# Patient Record
Sex: MALE | Race: WHITE | NOT HISPANIC OR LATINO
[De-identification: names, ages, dates, MRNs, and addresses within clinical notes are randomized per-mention and may not be internally consistent; named-entity substitution may affect disease eponyms.]

---

## 2017-03-26 ENCOUNTER — FORM ENCOUNTER (OUTPATIENT)
Age: 82
End: 2017-03-26

## 2017-03-27 ENCOUNTER — APPOINTMENT (OUTPATIENT)
Dept: CT IMAGING | Facility: HOSPITAL | Age: 82
End: 2017-03-27

## 2017-03-27 ENCOUNTER — OUTPATIENT (OUTPATIENT)
Dept: OUTPATIENT SERVICES | Facility: HOSPITAL | Age: 82
LOS: 1 days | End: 2017-03-27
Payer: MEDICARE

## 2017-03-27 DIAGNOSIS — I25.10 ATHEROSCLEROTIC HEART DISEASE OF NATIVE CORONARY ARTERY WITHOUT ANGINA PECTORIS: ICD-10-CM

## 2017-03-27 DIAGNOSIS — C34.90 MALIGNANT NEOPLASM OF UNSPECIFIED PART OF UNSPECIFIED BRONCHUS OR LUNG: ICD-10-CM

## 2017-03-27 DIAGNOSIS — Z98.89 OTHER SPECIFIED POSTPROCEDURAL STATES: Chronic | ICD-10-CM

## 2017-03-27 PROCEDURE — 71250 CT THORAX DX C-: CPT

## 2017-03-28 ENCOUNTER — NON-APPOINTMENT (OUTPATIENT)
Age: 82
End: 2017-03-28

## 2017-03-28 ENCOUNTER — APPOINTMENT (OUTPATIENT)
Dept: CARDIOLOGY | Facility: CLINIC | Age: 82
End: 2017-03-28

## 2017-03-28 VITALS
HEART RATE: 83 BPM | HEIGHT: 65 IN | BODY MASS INDEX: 27.49 KG/M2 | OXYGEN SATURATION: 98 % | SYSTOLIC BLOOD PRESSURE: 148 MMHG | RESPIRATION RATE: 17 BRPM | WEIGHT: 165 LBS | DIASTOLIC BLOOD PRESSURE: 78 MMHG

## 2017-03-28 DIAGNOSIS — Z87.898 PERSONAL HISTORY OF OTHER SPECIFIED CONDITIONS: ICD-10-CM

## 2017-04-03 ENCOUNTER — APPOINTMENT (OUTPATIENT)
Dept: THORACIC SURGERY | Facility: CLINIC | Age: 82
End: 2017-04-03

## 2017-04-03 VITALS
WEIGHT: 164 LBS | OXYGEN SATURATION: 97 % | SYSTOLIC BLOOD PRESSURE: 142 MMHG | DIASTOLIC BLOOD PRESSURE: 72 MMHG | HEIGHT: 65 IN | HEART RATE: 62 BPM | BODY MASS INDEX: 27.32 KG/M2 | RESPIRATION RATE: 18 BRPM

## 2017-09-29 ENCOUNTER — APPOINTMENT (OUTPATIENT)
Dept: CT IMAGING | Facility: HOSPITAL | Age: 82
End: 2017-09-29

## 2017-09-29 ENCOUNTER — APPOINTMENT (OUTPATIENT)
Dept: CARDIOLOGY | Facility: CLINIC | Age: 82
End: 2017-09-29
Payer: MEDICARE

## 2017-09-29 ENCOUNTER — NON-APPOINTMENT (OUTPATIENT)
Age: 82
End: 2017-09-29

## 2017-09-29 ENCOUNTER — OUTPATIENT (OUTPATIENT)
Dept: OUTPATIENT SERVICES | Facility: HOSPITAL | Age: 82
LOS: 1 days | End: 2017-09-29
Payer: MEDICARE

## 2017-09-29 VITALS
RESPIRATION RATE: 17 BRPM | WEIGHT: 162 LBS | SYSTOLIC BLOOD PRESSURE: 170 MMHG | OXYGEN SATURATION: 97 % | HEART RATE: 67 BPM | HEIGHT: 65 IN | BODY MASS INDEX: 26.99 KG/M2 | DIASTOLIC BLOOD PRESSURE: 78 MMHG

## 2017-09-29 DIAGNOSIS — Z85.118 PERSONAL HISTORY OF OTHER MALIGNANT NEOPLASM OF BRONCHUS AND LUNG: ICD-10-CM

## 2017-09-29 DIAGNOSIS — Z98.89 OTHER SPECIFIED POSTPROCEDURAL STATES: Chronic | ICD-10-CM

## 2017-09-29 DIAGNOSIS — C34.90 MALIGNANT NEOPLASM OF UNSPECIFIED PART OF UNSPECIFIED BRONCHUS OR LUNG: ICD-10-CM

## 2017-09-29 PROCEDURE — 93000 ELECTROCARDIOGRAM COMPLETE: CPT

## 2017-09-29 PROCEDURE — 71250 CT THORAX DX C-: CPT | Mod: 26

## 2017-09-29 PROCEDURE — 71250 CT THORAX DX C-: CPT

## 2017-09-29 PROCEDURE — 99215 OFFICE O/P EST HI 40 MIN: CPT

## 2017-10-09 ENCOUNTER — APPOINTMENT (OUTPATIENT)
Dept: THORACIC SURGERY | Facility: CLINIC | Age: 82
End: 2017-10-09
Payer: MEDICARE

## 2017-10-16 ENCOUNTER — APPOINTMENT (OUTPATIENT)
Dept: THORACIC SURGERY | Facility: CLINIC | Age: 82
End: 2017-10-16
Payer: MEDICARE

## 2017-10-16 VITALS
DIASTOLIC BLOOD PRESSURE: 64 MMHG | OXYGEN SATURATION: 98 % | HEART RATE: 78 BPM | BODY MASS INDEX: 27.49 KG/M2 | SYSTOLIC BLOOD PRESSURE: 174 MMHG | HEIGHT: 65 IN | WEIGHT: 165 LBS

## 2017-10-16 PROCEDURE — 99214 OFFICE O/P EST MOD 30 MIN: CPT

## 2018-01-19 ENCOUNTER — NON-APPOINTMENT (OUTPATIENT)
Age: 83
End: 2018-01-19

## 2018-01-19 ENCOUNTER — APPOINTMENT (OUTPATIENT)
Dept: CARDIOLOGY | Facility: CLINIC | Age: 83
End: 2018-01-19
Payer: MEDICARE

## 2018-01-19 VITALS
RESPIRATION RATE: 17 BRPM | HEART RATE: 87 BPM | SYSTOLIC BLOOD PRESSURE: 160 MMHG | HEIGHT: 65 IN | WEIGHT: 164 LBS | DIASTOLIC BLOOD PRESSURE: 80 MMHG | OXYGEN SATURATION: 98 % | BODY MASS INDEX: 27.32 KG/M2

## 2018-01-19 PROCEDURE — 93306 TTE W/DOPPLER COMPLETE: CPT

## 2018-01-19 PROCEDURE — 99215 OFFICE O/P EST HI 40 MIN: CPT

## 2018-01-19 PROCEDURE — 93000 ELECTROCARDIOGRAM COMPLETE: CPT

## 2018-02-01 ENCOUNTER — APPOINTMENT (OUTPATIENT)
Dept: PULMONOLOGY | Facility: CLINIC | Age: 83
End: 2018-02-01
Payer: MEDICARE

## 2018-02-01 VITALS
HEART RATE: 73 BPM | SYSTOLIC BLOOD PRESSURE: 150 MMHG | WEIGHT: 163 LBS | BODY MASS INDEX: 27.16 KG/M2 | HEIGHT: 65 IN | DIASTOLIC BLOOD PRESSURE: 60 MMHG | OXYGEN SATURATION: 97 %

## 2018-02-01 DIAGNOSIS — R91.8 OTHER NONSPECIFIC ABNORMAL FINDING OF LUNG FIELD: ICD-10-CM

## 2018-02-01 DIAGNOSIS — Z87.891 PERSONAL HISTORY OF NICOTINE DEPENDENCE: ICD-10-CM

## 2018-02-01 PROCEDURE — 99205 OFFICE O/P NEW HI 60 MIN: CPT

## 2018-04-20 ENCOUNTER — NON-APPOINTMENT (OUTPATIENT)
Age: 83
End: 2018-04-20

## 2018-04-20 ENCOUNTER — APPOINTMENT (OUTPATIENT)
Dept: CARDIOLOGY | Facility: CLINIC | Age: 83
End: 2018-04-20
Payer: MEDICARE

## 2018-04-20 VITALS
OXYGEN SATURATION: 98 % | SYSTOLIC BLOOD PRESSURE: 170 MMHG | HEART RATE: 100 BPM | RESPIRATION RATE: 17 BRPM | WEIGHT: 162 LBS | DIASTOLIC BLOOD PRESSURE: 68 MMHG | BODY MASS INDEX: 26.99 KG/M2 | HEIGHT: 65 IN

## 2018-04-20 PROCEDURE — 93000 ELECTROCARDIOGRAM COMPLETE: CPT

## 2018-04-20 PROCEDURE — 99215 OFFICE O/P EST HI 40 MIN: CPT

## 2018-08-17 ENCOUNTER — NON-APPOINTMENT (OUTPATIENT)
Age: 83
End: 2018-08-17

## 2018-08-17 ENCOUNTER — APPOINTMENT (OUTPATIENT)
Dept: CARDIOLOGY | Facility: CLINIC | Age: 83
End: 2018-08-17
Payer: MEDICARE

## 2018-08-17 VITALS
SYSTOLIC BLOOD PRESSURE: 135 MMHG | RESPIRATION RATE: 17 BRPM | WEIGHT: 158 LBS | OXYGEN SATURATION: 98 % | DIASTOLIC BLOOD PRESSURE: 56 MMHG | BODY MASS INDEX: 26.33 KG/M2 | HEART RATE: 76 BPM | HEIGHT: 65 IN

## 2018-08-17 PROCEDURE — 93000 ELECTROCARDIOGRAM COMPLETE: CPT

## 2018-08-17 PROCEDURE — 99214 OFFICE O/P EST MOD 30 MIN: CPT

## 2018-10-10 ENCOUNTER — FORM ENCOUNTER (OUTPATIENT)
Age: 83
End: 2018-10-10

## 2018-10-11 ENCOUNTER — OUTPATIENT (OUTPATIENT)
Dept: OUTPATIENT SERVICES | Facility: HOSPITAL | Age: 83
LOS: 1 days | End: 2018-10-11
Payer: MEDICARE

## 2018-10-11 ENCOUNTER — APPOINTMENT (OUTPATIENT)
Dept: CT IMAGING | Facility: HOSPITAL | Age: 83
End: 2018-10-11
Payer: MEDICARE

## 2018-10-11 DIAGNOSIS — Z98.89 OTHER SPECIFIED POSTPROCEDURAL STATES: Chronic | ICD-10-CM

## 2018-10-11 DIAGNOSIS — C34.32 MALIGNANT NEOPLASM OF LOWER LOBE, LEFT BRONCHUS OR LUNG: ICD-10-CM

## 2018-10-11 DIAGNOSIS — C34.31 MALIGNANT NEOPLASM OF LOWER LOBE, RIGHT BRONCHUS OR LUNG: ICD-10-CM

## 2018-10-11 PROCEDURE — 71250 CT THORAX DX C-: CPT | Mod: 26

## 2018-10-11 PROCEDURE — 71250 CT THORAX DX C-: CPT

## 2018-10-15 ENCOUNTER — APPOINTMENT (OUTPATIENT)
Dept: THORACIC SURGERY | Facility: CLINIC | Age: 83
End: 2018-10-15
Payer: MEDICARE

## 2018-10-15 VITALS
BODY MASS INDEX: 26.99 KG/M2 | DIASTOLIC BLOOD PRESSURE: 70 MMHG | OXYGEN SATURATION: 97 % | WEIGHT: 162 LBS | HEART RATE: 65 BPM | RESPIRATION RATE: 18 BRPM | SYSTOLIC BLOOD PRESSURE: 176 MMHG | TEMPERATURE: 98 F | HEIGHT: 65 IN

## 2018-10-15 PROCEDURE — 99214 OFFICE O/P EST MOD 30 MIN: CPT

## 2018-12-14 ENCOUNTER — NON-APPOINTMENT (OUTPATIENT)
Age: 83
End: 2018-12-14

## 2018-12-14 ENCOUNTER — APPOINTMENT (OUTPATIENT)
Dept: CARDIOLOGY | Facility: CLINIC | Age: 83
End: 2018-12-14
Payer: MEDICARE

## 2018-12-14 VITALS
RESPIRATION RATE: 17 BRPM | DIASTOLIC BLOOD PRESSURE: 80 MMHG | HEART RATE: 100 BPM | OXYGEN SATURATION: 99 % | HEIGHT: 65 IN | WEIGHT: 159 LBS | SYSTOLIC BLOOD PRESSURE: 163 MMHG | BODY MASS INDEX: 26.49 KG/M2

## 2018-12-14 PROCEDURE — 93000 ELECTROCARDIOGRAM COMPLETE: CPT

## 2018-12-14 PROCEDURE — 99214 OFFICE O/P EST MOD 30 MIN: CPT

## 2019-04-15 ENCOUNTER — APPOINTMENT (OUTPATIENT)
Dept: CARDIOLOGY | Facility: CLINIC | Age: 84
End: 2019-04-15
Payer: MEDICARE

## 2019-04-15 ENCOUNTER — NON-APPOINTMENT (OUTPATIENT)
Age: 84
End: 2019-04-15

## 2019-04-15 VITALS — SYSTOLIC BLOOD PRESSURE: 120 MMHG | OXYGEN SATURATION: 97 % | DIASTOLIC BLOOD PRESSURE: 68 MMHG | HEART RATE: 61 BPM

## 2019-04-15 PROCEDURE — 93306 TTE W/DOPPLER COMPLETE: CPT

## 2019-04-15 PROCEDURE — 93000 ELECTROCARDIOGRAM COMPLETE: CPT

## 2019-04-15 PROCEDURE — 99214 OFFICE O/P EST MOD 30 MIN: CPT

## 2019-08-20 ENCOUNTER — APPOINTMENT (OUTPATIENT)
Dept: CARDIOLOGY | Facility: CLINIC | Age: 84
End: 2019-08-20
Payer: MEDICARE

## 2019-08-20 ENCOUNTER — NON-APPOINTMENT (OUTPATIENT)
Age: 84
End: 2019-08-20

## 2019-08-20 VITALS
RESPIRATION RATE: 15 BRPM | WEIGHT: 156 LBS | OXYGEN SATURATION: 98 % | HEART RATE: 86 BPM | DIASTOLIC BLOOD PRESSURE: 73 MMHG | SYSTOLIC BLOOD PRESSURE: 135 MMHG | BODY MASS INDEX: 25.99 KG/M2 | HEIGHT: 65 IN

## 2019-08-20 PROCEDURE — 99214 OFFICE O/P EST MOD 30 MIN: CPT

## 2019-08-20 PROCEDURE — 93000 ELECTROCARDIOGRAM COMPLETE: CPT

## 2019-09-30 ENCOUNTER — APPOINTMENT (OUTPATIENT)
Dept: CT IMAGING | Facility: HOSPITAL | Age: 84
End: 2019-09-30
Payer: MEDICARE

## 2019-09-30 ENCOUNTER — OUTPATIENT (OUTPATIENT)
Dept: OUTPATIENT SERVICES | Facility: HOSPITAL | Age: 84
LOS: 1 days | End: 2019-09-30
Payer: MEDICARE

## 2019-09-30 DIAGNOSIS — C34.32 MALIGNANT NEOPLASM OF LOWER LOBE, LEFT BRONCHUS OR LUNG: ICD-10-CM

## 2019-09-30 DIAGNOSIS — C34.31 MALIGNANT NEOPLASM OF LOWER LOBE, RIGHT BRONCHUS OR LUNG: ICD-10-CM

## 2019-09-30 DIAGNOSIS — Z98.89 OTHER SPECIFIED POSTPROCEDURAL STATES: Chronic | ICD-10-CM

## 2019-09-30 PROCEDURE — 71250 CT THORAX DX C-: CPT

## 2019-09-30 PROCEDURE — 71250 CT THORAX DX C-: CPT | Mod: 26

## 2019-10-17 ENCOUNTER — APPOINTMENT (OUTPATIENT)
Dept: THORACIC SURGERY | Facility: CLINIC | Age: 84
End: 2019-10-17
Payer: MEDICARE

## 2019-10-17 VITALS
BODY MASS INDEX: 26.49 KG/M2 | HEIGHT: 65 IN | WEIGHT: 159 LBS | HEART RATE: 72 BPM | RESPIRATION RATE: 16 BRPM | TEMPERATURE: 98.3 F | DIASTOLIC BLOOD PRESSURE: 71 MMHG | SYSTOLIC BLOOD PRESSURE: 145 MMHG | OXYGEN SATURATION: 97 %

## 2019-10-17 DIAGNOSIS — Z00.00 ENCOUNTER FOR GENERAL ADULT MEDICAL EXAMINATION W/OUT ABNORMAL FINDINGS: ICD-10-CM

## 2019-10-17 PROCEDURE — 99214 OFFICE O/P EST MOD 30 MIN: CPT

## 2019-10-22 NOTE — ASSESSMENT
[FreeTextEntry1] : 87 year old male, follow up, s/p Left VATS, LLL wedge resection on 7/9/2015 for Stage IA (pT1b pN0 pMx) adenocarcinoma, and s/p Right VATS, RLL wedge resection on 1/30/2014 for Stage IA (pT1a pN0 pMx) adenocarcinoma.\par \par CT Chest on 9/30/19 reveals:\par - Stable bilateral lower lobe postoperative, stable scarring noted laterally in lingula\par - No new nodules or lymphadenopathy\par \par I have reviewed the patient's medical records and diagnostic images during the time of this office visit, and I have made the following recommendation: \par 1.  Return to office for follow up with CT Chest in 1 year.\par \par \par Written by Wendy Jurado NP, acting as a scribe for Dakota Land MD.\par \par The documentation recorded by the scribe accurately reflects the service I personally performed and the decisions made by me. DAKOTA LAND MD

## 2019-10-22 NOTE — PHYSICAL EXAM
[Sclera] : the sclera and conjunctiva were normal [PERRL With Normal Accommodation] : pupils were equal in size, round, and reactive to light [Neck Appearance] : the appearance of the neck was normal [] : no respiratory distress [Auscultation Breath Sounds / Voice Sounds] : lungs were clear to auscultation bilaterally [Respiration, Rhythm And Depth] : normal respiratory rhythm and effort [Edema] : there was no peripheral edema [Examination Of The Chest] : the chest was normal in appearance [Abdomen Soft] : soft [Abdomen Tenderness] : non-tender [Cervical Lymph Nodes Enlarged Anterior Bilaterally] : anterior cervical [Cervical Lymph Nodes Enlarged Posterior Bilaterally] : posterior cervical [Supraclavicular Lymph Nodes Enlarged Bilaterally] : supraclavicular [Abnormal Walk] : normal gait [Skin Turgor] : normal skin turgor [Skin Color & Pigmentation] : normal skin color and pigmentation [Oriented To Time, Place, And Person] : oriented to person, place, and time [No Focal Deficits] : no focal deficits [Affect] : the affect was normal [Mood] : the mood was normal [FreeTextEntry1] : murmur noted

## 2019-10-22 NOTE — CONSULT LETTER
[FreeTextEntry2] : Dr. Liliana Hamilton (PCP)\par Dr. Brayden Morales (Cardio)\par Dr. Kedar Duran (Pulm)\par

## 2019-10-22 NOTE — HISTORY OF PRESENT ILLNESS
[FreeTextEntry1] : Mr. Lindsey is an 87 year old male who presents today for one year follow up.  He is s/p Left VATS, LLL wedge resection on 7/9/2015 for Stage IA (pT1b pN0 pMx) adenocarcinoma, and s/p Right VATS, RLL wedge resection on 1/30/2014 for Stage IA (pT1a pN0 pMx) adenocarcinoma.\par \par CT Chest on 9/30/19 reveals:\par - Stable bilateral lower lobe postoperative, stable scarring noted laterally in lingula\par - No new nodules or lymphadenopathy\par - Stable scattered small cysts and subcentimeter hypodensities noted in liver\par \par He feels well and he denies any fever, chills, cough, shortness of breath, chest pain, hemoptysis, recent illness, or interval changes in health.

## 2019-12-17 ENCOUNTER — NON-APPOINTMENT (OUTPATIENT)
Age: 84
End: 2019-12-17

## 2019-12-17 ENCOUNTER — APPOINTMENT (OUTPATIENT)
Dept: CARDIOLOGY | Facility: CLINIC | Age: 84
End: 2019-12-17
Payer: MEDICARE

## 2019-12-17 VITALS
RESPIRATION RATE: 17 BRPM | BODY MASS INDEX: 25.83 KG/M2 | DIASTOLIC BLOOD PRESSURE: 66 MMHG | HEART RATE: 88 BPM | SYSTOLIC BLOOD PRESSURE: 163 MMHG | HEIGHT: 65 IN | OXYGEN SATURATION: 100 % | WEIGHT: 155 LBS

## 2019-12-17 PROCEDURE — 99215 OFFICE O/P EST HI 40 MIN: CPT

## 2019-12-17 PROCEDURE — 93000 ELECTROCARDIOGRAM COMPLETE: CPT

## 2020-06-23 ENCOUNTER — NON-APPOINTMENT (OUTPATIENT)
Age: 85
End: 2020-06-23

## 2020-06-23 ENCOUNTER — APPOINTMENT (OUTPATIENT)
Dept: CARDIOLOGY | Facility: CLINIC | Age: 85
End: 2020-06-23
Payer: MEDICARE

## 2020-06-23 VITALS
HEIGHT: 65 IN | OXYGEN SATURATION: 100 % | HEART RATE: 78 BPM | DIASTOLIC BLOOD PRESSURE: 55 MMHG | RESPIRATION RATE: 18 BRPM | WEIGHT: 155 LBS | SYSTOLIC BLOOD PRESSURE: 160 MMHG | BODY MASS INDEX: 25.83 KG/M2

## 2020-06-23 PROCEDURE — 93306 TTE W/DOPPLER COMPLETE: CPT

## 2020-06-23 PROCEDURE — 93000 ELECTROCARDIOGRAM COMPLETE: CPT

## 2020-06-23 PROCEDURE — 99215 OFFICE O/P EST HI 40 MIN: CPT

## 2020-07-28 ENCOUNTER — APPOINTMENT (OUTPATIENT)
Dept: CARDIOTHORACIC SURGERY | Facility: CLINIC | Age: 85
End: 2020-07-28

## 2020-07-28 ENCOUNTER — NON-APPOINTMENT (OUTPATIENT)
Age: 85
End: 2020-07-28

## 2020-07-28 ENCOUNTER — OUTPATIENT (OUTPATIENT)
Dept: OUTPATIENT SERVICES | Facility: HOSPITAL | Age: 85
LOS: 1 days | End: 2020-07-28
Payer: MEDICARE

## 2020-07-28 ENCOUNTER — APPOINTMENT (OUTPATIENT)
Dept: CARDIOTHORACIC SURGERY | Facility: CLINIC | Age: 85
End: 2020-07-28
Payer: MEDICARE

## 2020-07-28 VITALS
BODY MASS INDEX: 25.83 KG/M2 | DIASTOLIC BLOOD PRESSURE: 77 MMHG | WEIGHT: 155 LBS | SYSTOLIC BLOOD PRESSURE: 154 MMHG | HEIGHT: 65 IN

## 2020-07-28 VITALS — HEART RATE: 58 BPM | RESPIRATION RATE: 15 BRPM | TEMPERATURE: 98.3 F | OXYGEN SATURATION: 99 %

## 2020-07-28 DIAGNOSIS — I35.1 NONRHEUMATIC AORTIC (VALVE) INSUFFICIENCY: ICD-10-CM

## 2020-07-28 DIAGNOSIS — E03.9 HYPOTHYROIDISM, UNSPECIFIED: ICD-10-CM

## 2020-07-28 DIAGNOSIS — M10.9 GOUT, UNSPECIFIED: ICD-10-CM

## 2020-07-28 DIAGNOSIS — I35.0 NONRHEUMATIC AORTIC (VALVE) STENOSIS: ICD-10-CM

## 2020-07-28 DIAGNOSIS — Z98.89 OTHER SPECIFIED POSTPROCEDURAL STATES: Chronic | ICD-10-CM

## 2020-07-28 DIAGNOSIS — R53.83 OTHER FATIGUE: ICD-10-CM

## 2020-07-28 PROCEDURE — 93000 ELECTROCARDIOGRAM COMPLETE: CPT

## 2020-07-28 PROCEDURE — 99204 OFFICE O/P NEW MOD 45 MIN: CPT

## 2020-07-28 PROCEDURE — 93306 TTE W/DOPPLER COMPLETE: CPT | Mod: 26

## 2020-07-28 PROCEDURE — 93306 TTE W/DOPPLER COMPLETE: CPT

## 2020-07-28 PROCEDURE — 99215 OFFICE O/P EST HI 40 MIN: CPT

## 2020-07-28 RX ORDER — TRIAMTERENE AND HYDROCHLOROTHIAZIDE 50; 75 MG/1; MG/1
75-50 TABLET ORAL DAILY
Refills: 0 | Status: ACTIVE | COMMUNITY
Start: 2020-07-28

## 2020-07-28 RX ORDER — CYCLOSPORINE 0.5 MG/ML
0.05 EMULSION OPHTHALMIC
Refills: 5 | Status: ACTIVE | COMMUNITY
Start: 2020-07-28

## 2020-07-28 RX ORDER — RABEPRAZOLE SODIUM 20 MG/1
20 TABLET, DELAYED RELEASE ORAL DAILY
Refills: 0 | Status: ACTIVE | COMMUNITY
Start: 2020-07-28

## 2020-07-28 RX ORDER — POLYETHYLENE GLYCOL 400 AND PROPYLENE GLYCOL 4; 3 MG/ML; MG/ML
0.4-0.3 SOLUTION/ DROPS OPHTHALMIC 4 TIMES DAILY
Refills: 0 | Status: ACTIVE | COMMUNITY
Start: 2020-07-28

## 2020-07-28 RX ORDER — VERAPAMIL HYDROCHLORIDE 120 MG/1
120 TABLET ORAL
Refills: 3 | Status: ACTIVE | COMMUNITY
Start: 2020-07-28

## 2020-07-28 NOTE — REVIEW OF SYSTEMS
[Feeling Tired] : feeling tired [Negative] : ENT [Palpitations] : no palpitations [Chest Pain] : no chest pain

## 2020-07-28 NOTE — DATA REVIEWED
[FreeTextEntry1] : Echocardiogram on June 23, 2020 demonstrated \par Normal LV function. \par Severe aortic stenosis\par Mean gradient 45 mm Hg, peak gradient 88 mm Hg, mild AR\par

## 2020-07-28 NOTE — PHYSICAL EXAM
[General Appearance - Alert] : alert [General Appearance - In No Acute Distress] : in no acute distress [Sclera] : the sclera and conjunctiva were normal [Extraocular Movements] : extraocular movements were intact [PERRL With Normal Accommodation] : pupils were equal in size, round, and reactive to light [Outer Ear] : the ears and nose were normal in appearance [Oropharynx] : the oropharynx was normal [Jugular Venous Distention Increased] : there was no jugular-venous distention [Respiration, Rhythm And Depth] : normal respiratory rhythm and effort [] : no respiratory distress [II] : a grade 2 [III] : a grade 3 [Examination Of The Chest] : the chest was normal in appearance [Chest Visual Inspection Thoracic Asymmetry] : no chest asymmetry [Breast Palpation Mass] : no palpable masses [Breast Appearance] : normal in appearance [Abdomen Soft] : soft [Bowel Sounds] : normal bowel sounds [Cervical Lymph Nodes Enlarged Posterior Bilaterally] : posterior cervical [Cervical Lymph Nodes Enlarged Anterior Bilaterally] : anterior cervical [No CVA Tenderness] : no ~M costovertebral angle tenderness [Involuntary Movements] : no involuntary movements were seen [Skin Color & Pigmentation] : normal skin color and pigmentation [No Focal Deficits] : no focal deficits [Oriented To Time, Place, And Person] : oriented to person, place, and time [Impaired Insight] : insight and judgment were intact [Left Carotid Bruit] : no bruit heard over the left carotid [Right Carotid Bruit] : no bruit heard over the right carotid

## 2020-07-28 NOTE — CONSULT LETTER
[FreeTextEntry3] : Braden Sadler MD\par \par Cardiovascular & Thoracic Surgery\par Professor\par [facility]\par \par  [FreeTextEntry2] : DR. LASHONDA KINGSTON\par [address] [place] [address], [address]\[place] [address]\par [phone]\par [phone]

## 2020-07-28 NOTE — ASSESSMENT
[FreeTextEntry1] : Tim is an 88 year old male with PMH of non small cell lung adenocarcinoma (s/p Left VATS, LLL wedge resection on 7/2015 and s/p Right VATs, RLL wedge resection on 1/2014). He follows with Dr. Rai for his follow up. Other pertinent PMH hypothyroidism, HTN, and aortic valve stenosis. He follows with Dr. Morales for his cardiology care. Routine echocardiogram demonstrated mild MR, aortic valve stenosis, pGr 88 mmHg, mGr 45 mmHg, ANDREA 0.9 cm2, mild AI, AoV 4.7 m/sec, EF 66%.  He was referred to valve clinic for consideration for STEVE. \par \par Mr. Lindsey has severe AS with pG 88 mmHg and mG 45 mmHg, AoV 4.7 m/sec and an ANDREA of 0.9 cm2.  He is a low risk for surgical aortic valve replacement.  The risks and benefits of both SAVR and STEVE have been explained and the patient would like to proceed with further workup and consideration for STEVE by the structural heart team.  I explained the risks of vascular complication, pacemaker requirement and possible paravalvular leakage. He will require CT scan and catheterization before finalizing plans for the procedure. The patient was also made aware of the possibility of using conscious sedation or general anesthesia during the procedure.  Once completed, all imaging will be reviewed by the Heart Team and an optimal treatment strategy will be recommended.\par \par The patient has an underlying RBBB and is at increased risk for requiring a pacemaker following TAVR.\par \par Plan:\par 1) Lab work scheduled for today CBC, CMP and Pro BNP\par 2) Structural CT scan without coronaries \par 3) Cardiac Catherization to evaluate coronary anatomy \par \par

## 2020-07-28 NOTE — HISTORY OF PRESENT ILLNESS
[FreeTextEntry1] : Tim is an 88 year old male with PMH of hypothyroidism, BPH, GERD, HTN, non small cell lung adenocarcinoma (s/p Left VATS, LLL wedge resection on 7/2015 and s/p Right VATs, RLL wedge resection on 1/2014). He follows with Dr. Rai for his follow up. Other pertinent PMH hypothyroidism, HTN, and aortic valve stenosis. He follows with Dr. Morales for his cardiology care. Routine echocardiogram demonstrated mild MR, aortic valve stenosis, pGr 88 mmHg, mGr 45 mmHg, ANDREA 0.9 cm2, mild AI, AoV 4.7 m/sec, EF 66%.  He was referred to valve clinic for consideration for STEVE. He reports feeling sluggish and his wife reports he "doesn't do what he used to do." His fatigue has increased throughout the day and he reports YADAV and at times some chest pressure throughout the day.

## 2020-08-07 ENCOUNTER — OUTPATIENT (OUTPATIENT)
Dept: OUTPATIENT SERVICES | Facility: HOSPITAL | Age: 85
LOS: 1 days | End: 2020-08-07
Payer: MEDICARE

## 2020-08-07 ENCOUNTER — APPOINTMENT (OUTPATIENT)
Dept: CARDIOLOGY | Facility: CLINIC | Age: 85
End: 2020-08-07

## 2020-08-07 ENCOUNTER — RESULT REVIEW (OUTPATIENT)
Age: 85
End: 2020-08-07

## 2020-08-07 DIAGNOSIS — R07.9 CHEST PAIN, UNSPECIFIED: ICD-10-CM

## 2020-08-07 DIAGNOSIS — Z98.89 OTHER SPECIFIED POSTPROCEDURAL STATES: Chronic | ICD-10-CM

## 2020-08-07 DIAGNOSIS — I35.0 NONRHEUMATIC AORTIC (VALVE) STENOSIS: ICD-10-CM

## 2020-08-07 PROCEDURE — 75572 CT HRT W/3D IMAGE: CPT | Mod: 26

## 2020-08-07 PROCEDURE — 75572 CT HRT W/3D IMAGE: CPT

## 2020-08-08 DIAGNOSIS — Z01.818 ENCOUNTER FOR OTHER PREPROCEDURAL EXAMINATION: ICD-10-CM

## 2020-08-09 ENCOUNTER — APPOINTMENT (OUTPATIENT)
Dept: DISASTER EMERGENCY | Facility: CLINIC | Age: 85
End: 2020-08-09

## 2020-08-10 LAB — SARS-COV-2 N GENE NPH QL NAA+PROBE: NOT DETECTED

## 2020-08-12 ENCOUNTER — OUTPATIENT (OUTPATIENT)
Dept: OUTPATIENT SERVICES | Facility: HOSPITAL | Age: 85
LOS: 1 days | End: 2020-08-12
Payer: MEDICARE

## 2020-08-12 VITALS
SYSTOLIC BLOOD PRESSURE: 193 MMHG | WEIGHT: 162.04 LBS | DIASTOLIC BLOOD PRESSURE: 68 MMHG | TEMPERATURE: 98 F | HEART RATE: 60 BPM | OXYGEN SATURATION: 98 % | RESPIRATION RATE: 16 BRPM | HEIGHT: 68 IN

## 2020-08-12 VITALS
HEART RATE: 53 BPM | RESPIRATION RATE: 18 BRPM | OXYGEN SATURATION: 97 % | DIASTOLIC BLOOD PRESSURE: 77 MMHG | SYSTOLIC BLOOD PRESSURE: 178 MMHG

## 2020-08-12 DIAGNOSIS — Z98.89 OTHER SPECIFIED POSTPROCEDURAL STATES: Chronic | ICD-10-CM

## 2020-08-12 DIAGNOSIS — I35.0 NONRHEUMATIC AORTIC (VALVE) STENOSIS: ICD-10-CM

## 2020-08-12 LAB
ANION GAP SERPL CALC-SCNC: 14 MMOL/L — SIGNIFICANT CHANGE UP (ref 5–17)
BUN SERPL-MCNC: 22 MG/DL — SIGNIFICANT CHANGE UP (ref 7–23)
CALCIUM SERPL-MCNC: 10 MG/DL — SIGNIFICANT CHANGE UP (ref 8.4–10.5)
CHLORIDE SERPL-SCNC: 101 MMOL/L — SIGNIFICANT CHANGE UP (ref 96–108)
CO2 SERPL-SCNC: 22 MMOL/L — SIGNIFICANT CHANGE UP (ref 22–31)
CREAT SERPL-MCNC: 1.5 MG/DL — HIGH (ref 0.5–1.3)
GLUCOSE SERPL-MCNC: 104 MG/DL — HIGH (ref 70–99)
HCT VFR BLD CALC: 30.3 % — LOW (ref 39–50)
HGB BLD-MCNC: 9.2 G/DL — LOW (ref 13–17)
MCHC RBC-ENTMCNC: 25.3 PG — LOW (ref 27–34)
MCHC RBC-ENTMCNC: 30.4 GM/DL — LOW (ref 32–36)
MCV RBC AUTO: 83.5 FL — SIGNIFICANT CHANGE UP (ref 80–100)
NRBC # BLD: 0 /100 WBCS — SIGNIFICANT CHANGE UP (ref 0–0)
PLATELET # BLD AUTO: 216 K/UL — SIGNIFICANT CHANGE UP (ref 150–400)
POTASSIUM SERPL-MCNC: 4.3 MMOL/L — SIGNIFICANT CHANGE UP (ref 3.5–5.3)
POTASSIUM SERPL-SCNC: 4.3 MMOL/L — SIGNIFICANT CHANGE UP (ref 3.5–5.3)
RBC # BLD: 3.63 M/UL — LOW (ref 4.2–5.8)
RBC # FLD: 19.6 % — HIGH (ref 10.3–14.5)
SODIUM SERPL-SCNC: 137 MMOL/L — SIGNIFICANT CHANGE UP (ref 135–145)
WBC # BLD: 6.42 K/UL — SIGNIFICANT CHANGE UP (ref 3.8–10.5)
WBC # FLD AUTO: 6.42 K/UL — SIGNIFICANT CHANGE UP (ref 3.8–10.5)

## 2020-08-12 PROCEDURE — C1887: CPT

## 2020-08-12 PROCEDURE — C1894: CPT

## 2020-08-12 PROCEDURE — 93010 ELECTROCARDIOGRAM REPORT: CPT

## 2020-08-12 PROCEDURE — 99152 MOD SED SAME PHYS/QHP 5/>YRS: CPT

## 2020-08-12 PROCEDURE — 93460 R&L HRT ART/VENTRICLE ANGIO: CPT | Mod: 26

## 2020-08-12 PROCEDURE — 80048 BASIC METABOLIC PNL TOTAL CA: CPT

## 2020-08-12 PROCEDURE — 93460 R&L HRT ART/VENTRICLE ANGIO: CPT

## 2020-08-12 PROCEDURE — C1769: CPT

## 2020-08-12 PROCEDURE — 85027 COMPLETE CBC AUTOMATED: CPT

## 2020-08-12 PROCEDURE — 93005 ELECTROCARDIOGRAM TRACING: CPT

## 2020-08-12 RX ORDER — HYDRALAZINE HCL 50 MG
10 TABLET ORAL ONCE
Refills: 0 | Status: COMPLETED | OUTPATIENT
Start: 2020-08-12 | End: 2020-08-12

## 2020-08-12 RX ORDER — SODIUM CHLORIDE 9 MG/ML
3 INJECTION INTRAMUSCULAR; INTRAVENOUS; SUBCUTANEOUS EVERY 8 HOURS
Refills: 0 | Status: DISCONTINUED | OUTPATIENT
Start: 2020-08-12 | End: 2020-08-27

## 2020-08-12 RX ADMIN — Medication 10 MILLIGRAM(S): at 18:30

## 2020-08-12 NOTE — H&P CARDIOLOGY - HISTORY OF PRESENT ILLNESS
87 y/o  male former smoker (100 PYH), daily ETOH dependence (2 glasses of wine/night last drink 8/11pm) and pmh of HTN, HLD, Severe AS, bradycardia, RBBB, lung cancer with nodules Right/Left  s/p right VAT, Right lung wedge resection on 1/30/14 s/p LLL VATS and resection 7/2015 followed by Dr. Morales, Cardiologist s/p TTE on 7/28/20 with LVEF 60-65% and severe AS with ANDREA 0.9sqcm with mild Diastolic LV dysfunction referred to Dr. Adkins, Structural Heart and Dr. Sadler, CTS and presents for R&LH for TAVR workup.  Pt denies cp, sob or palpitation with no implantable devices and denies symptoms was an incidental finding by his PCP.
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
## 2020-08-12 NOTE — CHART NOTE - NSCHARTNOTEFT_GEN_A_CORE
Patent ready to be discharged but with persistent -200 post procedure, asymptomatic.     Plan:  10mg Hydralazine IVP ordered.    BP to be repeated Repeat /77, HR 54, 98% RA.  Patient discharged with family    Noemi Agudelo, [facility]  x2022

## 2020-08-12 NOTE — ASU PATIENT PROFILE, ADULT - ABILITY TO HEAR (WITH HEARING AID OR HEARING APPLIANCE IF NORMALLY USED):
Mildly to Moderately Impaired: difficulty hearing in some environments or speaker may need to increase volume or speak distinctly/no hearing aid
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
## 2020-08-12 NOTE — ASU DISCHARGE PLAN (ADULT/PEDIATRIC) - CALL YOUR DOCTOR IF YOU HAVE ANY OF THE FOLLOWING:
Wound/Surgical Site with redness, or foul smelling discharge or pus/Numbness, tingling, color or temperature change to extremity/Swelling that gets worse/Bleeding that does not stop/Pain not relieved by Medications

## 2020-08-12 NOTE — H&P CARDIOLOGY - PATIENT REFERRED TO
----- Message from Guy Rivera sent at 4/18/2019  9:23 AM CDT -----  Contact: Patient  Type: Needs Medical Advice    Who Called:  Patient  Best Call Back Number: [phone]  Additional Information: Patient would like to discuss scheduling lab work before upcoming appointment. Thanks!    
Advised patient that lab orders will be sent to [facility] on Monday morning. Patient verbalized understanding.  
Other (specify)/Cardiac catherization with possible intervention

## 2020-08-12 NOTE — H&P CARDIOLOGY - TOBACCO USE
Hpi Title: Evaluation of Skin Lesions
How Severe Are Your Spot(S)?: mild
Have Your Spot(S) Been Treated In The Past?: has not been treated
Former smoker

## 2020-09-14 ENCOUNTER — OUTPATIENT (OUTPATIENT)
Dept: OUTPATIENT SERVICES | Facility: HOSPITAL | Age: 85
LOS: 1 days | End: 2020-09-14
Payer: MEDICARE

## 2020-09-14 ENCOUNTER — APPOINTMENT (OUTPATIENT)
Dept: ULTRASOUND IMAGING | Facility: HOSPITAL | Age: 85
End: 2020-09-14

## 2020-09-14 VITALS
SYSTOLIC BLOOD PRESSURE: 152 MMHG | HEART RATE: 58 BPM | WEIGHT: 153 LBS | RESPIRATION RATE: 14 BRPM | HEIGHT: 68 IN | OXYGEN SATURATION: 98 % | DIASTOLIC BLOOD PRESSURE: 60 MMHG | TEMPERATURE: 99 F

## 2020-09-14 DIAGNOSIS — I35.0 NONRHEUMATIC AORTIC (VALVE) STENOSIS: ICD-10-CM

## 2020-09-14 DIAGNOSIS — Z98.89 OTHER SPECIFIED POSTPROCEDURAL STATES: Chronic | ICD-10-CM

## 2020-09-14 DIAGNOSIS — Z01.818 ENCOUNTER FOR OTHER PREPROCEDURAL EXAMINATION: ICD-10-CM

## 2020-09-14 DIAGNOSIS — I10 ESSENTIAL (PRIMARY) HYPERTENSION: ICD-10-CM

## 2020-09-14 DIAGNOSIS — E03.9 HYPOTHYROIDISM, UNSPECIFIED: ICD-10-CM

## 2020-09-14 DIAGNOSIS — Z29.9 ENCOUNTER FOR PROPHYLACTIC MEASURES, UNSPECIFIED: ICD-10-CM

## 2020-09-14 LAB
ANION GAP SERPL CALC-SCNC: 14 MMOL/L — SIGNIFICANT CHANGE UP (ref 5–17)
BLD GP AB SCN SERPL QL: NEGATIVE — SIGNIFICANT CHANGE UP
BUN SERPL-MCNC: 25 MG/DL — HIGH (ref 7–23)
CALCIUM SERPL-MCNC: 9.8 MG/DL — SIGNIFICANT CHANGE UP (ref 8.4–10.5)
CHLORIDE SERPL-SCNC: 103 MMOL/L — SIGNIFICANT CHANGE UP (ref 96–108)
CO2 SERPL-SCNC: 21 MMOL/L — LOW (ref 22–31)
CREAT SERPL-MCNC: 1.5 MG/DL — HIGH (ref 0.5–1.3)
GLUCOSE SERPL-MCNC: 96 MG/DL — SIGNIFICANT CHANGE UP (ref 70–99)
HCT VFR BLD CALC: 29.4 % — LOW (ref 39–50)
HGB BLD-MCNC: 9.2 G/DL — LOW (ref 13–17)
MCHC RBC-ENTMCNC: 26.9 PG — LOW (ref 27–34)
MCHC RBC-ENTMCNC: 31.3 GM/DL — LOW (ref 32–36)
MCV RBC AUTO: 86 FL — SIGNIFICANT CHANGE UP (ref 80–100)
NRBC # BLD: 0 /100 WBCS — SIGNIFICANT CHANGE UP (ref 0–0)
PLATELET # BLD AUTO: 211 K/UL — SIGNIFICANT CHANGE UP (ref 150–400)
POTASSIUM SERPL-MCNC: 5.1 MMOL/L — SIGNIFICANT CHANGE UP (ref 3.5–5.3)
POTASSIUM SERPL-SCNC: 5.1 MMOL/L — SIGNIFICANT CHANGE UP (ref 3.5–5.3)
RBC # BLD: 3.42 M/UL — LOW (ref 4.2–5.8)
RBC # FLD: 20 % — HIGH (ref 10.3–14.5)
RH IG SCN BLD-IMP: POSITIVE — SIGNIFICANT CHANGE UP
SARS-COV-2 RNA SPEC QL NAA+PROBE: SIGNIFICANT CHANGE UP
SODIUM SERPL-SCNC: 138 MMOL/L — SIGNIFICANT CHANGE UP (ref 135–145)
WBC # BLD: 5.42 K/UL — SIGNIFICANT CHANGE UP (ref 3.8–10.5)
WBC # FLD AUTO: 5.42 K/UL — SIGNIFICANT CHANGE UP (ref 3.8–10.5)

## 2020-09-14 PROCEDURE — 71046 X-RAY EXAM CHEST 2 VIEWS: CPT | Mod: 26

## 2020-09-14 PROCEDURE — 93880 EXTRACRANIAL BILAT STUDY: CPT | Mod: 26

## 2020-09-14 RX ORDER — CEFUROXIME AXETIL 250 MG
1500 TABLET ORAL ONCE
Refills: 0 | Status: DISCONTINUED | OUTPATIENT
Start: 2020-09-16 | End: 2020-09-18

## 2020-09-14 RX ORDER — LEVOTHYROXINE SODIUM 125 MCG
1 TABLET ORAL
Qty: 0 | Refills: 0 | DISCHARGE

## 2020-09-14 NOTE — H&P PST ADULT - PAIN COMMENT, PROFILE
Lower back pain prn. Pt. uses heating pad for pain, prn Occasional Lower back pain- Pt. uses heating pad for pain, prn

## 2020-09-14 NOTE — H&P PST ADULT - HISTORY OF PRESENT ILLNESS
87 y/o male, 87 y/o male, poor historian, with h/o gout, HTN, BPH, hypothyroid, adenocarcinoma of lung (S/P VATS RLL resection 2014 & LLL resection 2015), c/o worsening exertional dyspnea, fatigue and functional decline. Denies angina, edema, syncope, fever or chills. Recent Echo completed 7/28/2020 revealed severe aortic stenosis. Pre-TAVR angio completed 8/12/2020. Presents to [facility] for a scheduled TAVR 9/16/2020.    ***Covid swab done today at [facility]

## 2020-09-14 NOTE — H&P PST ADULT - NSICDXPROBLEM_GEN_ALL_CORE_FT
PROBLEM DIAGNOSES  Problem: Aortic stenosis  Assessment and Plan:     Problem: Hypothyroid  Assessment and Plan:     Problem: Need for prophylactic measure  Assessment and Plan:        PROBLEM DIAGNOSES  Problem: Aortic stenosis  Assessment and Plan: Scheduled TAVR 9/16/2020  Pre-op instructions given to pt, lab work sent at [facility]  Chlorhexidine soap provided and directions explained  Carotid dopplers and CXR ordered at [facility]  Covid swab sent at [facility]    Problem: HTN (hypertension)  Assessment and Plan: Pt to continue medication     Problem: Hypothyroid  Assessment and Plan: Pt to continue medication     Problem: Need for prophylactic measure  Assessment and Plan: The Caprini score indicates this patient is at risk for a VTE event (score 3-5).  Most surgical patients in this group would benefit from pharmacologic prophylaxis.  The surgical team will determine the balance between VTE risk and bleeding risk

## 2020-09-14 NOTE — H&P PST ADULT - NSANTHOSAYNRD_GEN_A_CORE
No. HERNAN screening performed.  STOP BANG Legend: 0-2 = LOW Risk; 3-4 = INTERMEDIATE Risk; 5-8 = HIGH Risk

## 2020-09-14 NOTE — H&P PST ADULT - ABILITY TO HEAR (WITH HEARING AID OR HEARING APPLIANCE IF NORMALLY USED):
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 Current ear infection bi-lateraly  pt. on Amoxicillin 500 mg 3x daily./Severely Impaired: absence of useful hearing Severely Impaired: absence of useful hearing/Loss of hearing in right ear
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
## 2020-09-14 NOTE — H&P PST ADULT - ASSESSMENT
CAPRINI SCORE [CLOT updated 18]    AGE RELATED RISK FACTORS                                                       MOBILITY RELATED FACTORS  [ ] Age 41-60 years                                            (1 Point)                    [ ] Bed rest                                                        (1 Point)  [ ] Age: 61-74 years                                           (2 Points)                  [ ] Plaster cast                                                   (2 Points)  [x] Age= 75 years                                              (3 Points)                    [ ] Bed bound for more than 72 hours                 (2 Points)    DISEASE RELATED RISK FACTORS                                               GENDER SPECIFIC FACTORS  [ ] Edema in the lower extremities                       (1 Point)              [ ] Pregnancy                                                     (1 Point)  [ ] Varicose veins                                               (1 Point)                     [ ] Post-partum < 6 weeks                                   (1 Point)             [ ] BMI > 25 Kg/m2                                            (1 Point)                     [ ] Hormonal therapy  or oral contraception          (1 Point)                 [ ] Sepsis (in the previous month)                        (1 Point)               [ ] History of pregnancy complications                 (1 point)  [ ] Pneumonia or serious lung disease                                               [ ] Unexplained or recurrent                     (1 Point)           (in the previous month)                               (1 Point)  [ ] Abnormal pulmonary function test                     (1 Point)                 SURGERY RELATED RISK FACTORS  [ ] Acute myocardial infarction                              (1 Point)               [ ]  Section                                             (1 Point)  [ ] Congestive heart failure (in the previous month)  (1 Point)      [ ] Minor surgery                                                  (1 Point)   [ ] Inflammatory bowel disease                             (1 Point)               [ ] Arthroscopic surgery                                        (2 Points)  [ ] Central venous access                                      (2 Points)                [x] General surgery lasting more than 45 minutes (2 points)  [ ] Present or previous malignancy                     (2 Points)                [ ] Elective arthroplasty                                         (5 points)    [ ] Stroke (in the previous month)                          (5 Points)                                                                                                                                                           HEMATOLOGY RELATED FACTORS                                                 TRAUMA RELATED RISK FACTORS  [ ] Prior episodes of VTE                                     (3 Points)                [ ] Fracture of the hip, pelvis, or leg                       (5 Points)  [ ] Positive family history for VTE                         (3 Points)             [ ] Acute spinal cord injury (in the previous month)  (5 Points)  [ ] Prothrombin 66840 A                                     (3 Points)               [ ] Paralysis  (less than 1 month)                             (5 Points)  [ ] Factor V Leiden                                             (3 Points)                  [ ] Multiple Trauma within 1 month                        (5 Points)  [ ] Lupus anticoagulants                                     (3 Points)                                                           [ ] Anticardiolipin antibodies                               (3 Points)                                                       [ ] High homocysteine in the blood                      (3 Points)                                             [ ] Other congenital or acquired thrombophilia      (3 Points)                                                [ ] Heparin induced thrombocytopenia                  (3 Points)                                     Total Score [    5      ] CAPRINI SCORE [CLOT updated 18]    AGE RELATED RISK FACTORS                                                       MOBILITY RELATED FACTORS  [ ] Age 41-60 years                                            (1 Point)                    [ ] Bed rest                                                        (1 Point)  [ ] Age: 61-74 years                                           (2 Points)                  [ ] Plaster cast                                                   (2 Points)  [x] Age= 75 years                                              (3 Points)                    [ ] Bed bound for more than 72 hours                 (2 Points)    DISEASE RELATED RISK FACTORS                                               GENDER SPECIFIC FACTORS  [ ] Edema in the lower extremities                       (1 Point)              [ ] Pregnancy                                                     (1 Point)  [ ] Varicose veins                                               (1 Point)                     [ ] Post-partum < 6 weeks                                   (1 Point)             [ ] BMI > 25 Kg/m2                                            (1 Point)                     [ ] Hormonal therapy  or oral contraception          (1 Point)                 [ ] Sepsis (in the previous month)                        (1 Point)               [ ] History of pregnancy complications                 (1 point)  [ ] Pneumonia or serious lung disease                                               [ ] Unexplained or recurrent                     (1 Point)           (in the previous month)                               (1 Point)  [ ] Abnormal pulmonary function test                     (1 Point)                 SURGERY RELATED RISK FACTORS  [ ] Acute myocardial infarction                              (1 Point)               [ ]  Section                                             (1 Point)  [ ] Congestive heart failure (in the previous month)  (1 Point)      [ ] Minor surgery                                                  (1 Point)   [ ] Inflammatory bowel disease                             (1 Point)               [ ] Arthroscopic surgery                                        (2 Points)  [ ] Central venous access                                      (2 Points)                [x] General surgery lasting more than 45 minutes (2 points)  [ ] Present or previous malignancy                     (2 Points)                [ ] Elective arthroplasty                                         (5 points)    [ ] Stroke (in the previous month)                          (5 Points)                                                                                                                                                           HEMATOLOGY RELATED FACTORS                                                 TRAUMA RELATED RISK FACTORS  [ ] Prior episodes of VTE                                     (3 Points)                [ ] Fracture of the hip, pelvis, or leg                       (5 Points)  [ ] Positive family history for VTE                         (3 Points)             [ ] Acute spinal cord injury (in the previous month)  (5 Points)  [ ] Prothrombin 65379 A                                     (3 Points)               [ ] Paralysis  (less than 1 month)                             (5 Points)  [ ] Factor V Leiden                                             (3 Points)                  [ ] Multiple Trauma within 1 month                        (5 Points)  [ ] Lupus anticoagulants                                     (3 Points)                                                           [ ] Anticardiolipin antibodies                               (3 Points)                                                       [ ] High homocysteine in the blood                      (3 Points)                                             [ ] Other congenital or acquired thrombophilia      (3 Points)                                                [ ] Heparin induced thrombocytopenia                  (3 Points)                                     Total Score [    5     ]

## 2020-09-15 LAB
A1C WITH ESTIMATED AVERAGE GLUCOSE RESULT: 5.4 % — SIGNIFICANT CHANGE UP (ref 4–5.6)
ESTIMATED AVERAGE GLUCOSE: 108 MG/DL — SIGNIFICANT CHANGE UP (ref 68–114)
MRSA PCR RESULT.: SIGNIFICANT CHANGE UP
S AUREUS DNA NOSE QL NAA+PROBE: SIGNIFICANT CHANGE UP

## 2020-09-16 ENCOUNTER — INPATIENT (INPATIENT)
Facility: HOSPITAL | Age: 85
LOS: 1 days | Discharge: HOME | DRG: 267 | End: 2020-09-18
Attending: THORACIC SURGERY (CARDIOTHORACIC VASCULAR SURGERY) | Admitting: THORACIC SURGERY (CARDIOTHORACIC VASCULAR SURGERY)
Payer: MEDICARE

## 2020-09-16 ENCOUNTER — APPOINTMENT (OUTPATIENT)
Dept: CARDIOTHORACIC SURGERY | Facility: HOSPITAL | Age: 85
End: 2020-09-16

## 2020-09-16 VITALS
SYSTOLIC BLOOD PRESSURE: 190 MMHG | DIASTOLIC BLOOD PRESSURE: 65 MMHG | RESPIRATION RATE: 18 BRPM | HEART RATE: 64 BPM | OXYGEN SATURATION: 100 % | WEIGHT: 154.54 LBS | TEMPERATURE: 98 F | HEIGHT: 68 IN

## 2020-09-16 DIAGNOSIS — I44.2 ATRIOVENTRICULAR BLOCK, COMPLETE: ICD-10-CM

## 2020-09-16 DIAGNOSIS — Z95.2 PRESENCE OF PROSTHETIC HEART VALVE: ICD-10-CM

## 2020-09-16 DIAGNOSIS — Z98.89 OTHER SPECIFIED POSTPROCEDURAL STATES: Chronic | ICD-10-CM

## 2020-09-16 DIAGNOSIS — I35.0 NONRHEUMATIC AORTIC (VALVE) STENOSIS: ICD-10-CM

## 2020-09-16 LAB
ALBUMIN SERPL ELPH-MCNC: 4.1 G/DL — SIGNIFICANT CHANGE UP (ref 3.3–5)
ALP SERPL-CCNC: 71 U/L — SIGNIFICANT CHANGE UP (ref 40–120)
ALT FLD-CCNC: 10 U/L — SIGNIFICANT CHANGE UP (ref 10–45)
ANION GAP SERPL CALC-SCNC: 13 MMOL/L — SIGNIFICANT CHANGE UP (ref 5–17)
ANISOCYTOSIS BLD QL: SLIGHT — SIGNIFICANT CHANGE UP
APTT BLD: 27.3 SEC — LOW (ref 27.5–35.5)
AST SERPL-CCNC: 22 U/L — SIGNIFICANT CHANGE UP (ref 10–40)
BASOPHILS # BLD AUTO: 0 K/UL — SIGNIFICANT CHANGE UP (ref 0–0.2)
BASOPHILS NFR BLD AUTO: 0 % — SIGNIFICANT CHANGE UP (ref 0–2)
BILIRUB SERPL-MCNC: 0.3 MG/DL — SIGNIFICANT CHANGE UP (ref 0.2–1.2)
BUN SERPL-MCNC: 23 MG/DL — SIGNIFICANT CHANGE UP (ref 7–23)
CALCIUM SERPL-MCNC: 9.4 MG/DL — SIGNIFICANT CHANGE UP (ref 8.4–10.5)
CHLORIDE SERPL-SCNC: 102 MMOL/L — SIGNIFICANT CHANGE UP (ref 96–108)
CO2 SERPL-SCNC: 22 MMOL/L — SIGNIFICANT CHANGE UP (ref 22–31)
CREAT SERPL-MCNC: 1.41 MG/DL — HIGH (ref 0.5–1.3)
EOSINOPHIL # BLD AUTO: 0.1 K/UL — SIGNIFICANT CHANGE UP (ref 0–0.5)
EOSINOPHIL NFR BLD AUTO: 3.5 % — SIGNIFICANT CHANGE UP (ref 0–6)
GLUCOSE BLDC GLUCOMTR-MCNC: 114 MG/DL — HIGH (ref 70–99)
GLUCOSE SERPL-MCNC: 115 MG/DL — HIGH (ref 70–99)
HCT VFR BLD CALC: 29.7 % — LOW (ref 39–50)
HGB BLD-MCNC: 9.1 G/DL — LOW (ref 13–17)
HYPOCHROMIA BLD QL: SLIGHT — SIGNIFICANT CHANGE UP
INR BLD: 1.03 RATIO — SIGNIFICANT CHANGE UP (ref 0.88–1.16)
LYMPHOCYTES # BLD AUTO: 0.54 K/UL — LOW (ref 1–3.3)
LYMPHOCYTES # BLD AUTO: 19.3 % — SIGNIFICANT CHANGE UP (ref 13–44)
MACROCYTES BLD QL: SLIGHT — SIGNIFICANT CHANGE UP
MANUAL SMEAR VERIFICATION: SIGNIFICANT CHANGE UP
MCHC RBC-ENTMCNC: 26.7 PG — LOW (ref 27–34)
MCHC RBC-ENTMCNC: 30.6 GM/DL — LOW (ref 32–36)
MCV RBC AUTO: 87.1 FL — SIGNIFICANT CHANGE UP (ref 80–100)
MICROCYTES BLD QL: SLIGHT — SIGNIFICANT CHANGE UP
MONOCYTES # BLD AUTO: 0.07 K/UL — SIGNIFICANT CHANGE UP (ref 0–0.9)
MONOCYTES NFR BLD AUTO: 2.6 % — SIGNIFICANT CHANGE UP (ref 2–14)
NEUTROPHILS # BLD AUTO: 2.07 K/UL — SIGNIFICANT CHANGE UP (ref 1.8–7.4)
NEUTROPHILS NFR BLD AUTO: 72.8 % — SIGNIFICANT CHANGE UP (ref 43–77)
NEUTS BAND # BLD: 0.9 % — SIGNIFICANT CHANGE UP (ref 0–8)
OVALOCYTES BLD QL SMEAR: SLIGHT — SIGNIFICANT CHANGE UP
PLAT MORPH BLD: NORMAL — SIGNIFICANT CHANGE UP
PLATELET # BLD AUTO: 149 K/UL — LOW (ref 150–400)
POIKILOCYTOSIS BLD QL AUTO: SLIGHT — SIGNIFICANT CHANGE UP
POLYCHROMASIA BLD QL SMEAR: SLIGHT — SIGNIFICANT CHANGE UP
POTASSIUM SERPL-MCNC: 4.9 MMOL/L — SIGNIFICANT CHANGE UP (ref 3.5–5.3)
POTASSIUM SERPL-SCNC: 4.9 MMOL/L — SIGNIFICANT CHANGE UP (ref 3.5–5.3)
PROT SERPL-MCNC: 6.9 G/DL — SIGNIFICANT CHANGE UP (ref 6–8.3)
PROTHROM AB SERPL-ACNC: 12.2 SEC — SIGNIFICANT CHANGE UP (ref 10.6–13.6)
RBC # BLD: 3.41 M/UL — LOW (ref 4.2–5.8)
RBC # FLD: 20 % — HIGH (ref 10.3–14.5)
RBC BLD AUTO: ABNORMAL
SODIUM SERPL-SCNC: 137 MMOL/L — SIGNIFICANT CHANGE UP (ref 135–145)
VARIANT LYMPHS # BLD: 0.9 % — SIGNIFICANT CHANGE UP (ref 0–6)
WBC # BLD: 2.81 K/UL — LOW (ref 3.8–10.5)
WBC # FLD AUTO: 2.81 K/UL — LOW (ref 3.8–10.5)

## 2020-09-16 PROCEDURE — 93010 ELECTROCARDIOGRAM REPORT: CPT

## 2020-09-16 PROCEDURE — 99232 SBSQ HOSP IP/OBS MODERATE 35: CPT | Mod: 25

## 2020-09-16 PROCEDURE — 33361 REPLACE AORTIC VALVE PERQ: CPT | Mod: 62,Q0

## 2020-09-16 PROCEDURE — 93355 ECHO TRANSESOPHAGEAL (TEE): CPT

## 2020-09-16 PROCEDURE — 33274 TCAT INSJ/RPL PERM LDLS PM: CPT | Mod: Q0

## 2020-09-16 RX ORDER — LANOLIN ALCOHOL/MO/W.PET/CERES
3 CREAM (GRAM) TOPICAL AT BEDTIME
Refills: 0 | Status: DISCONTINUED | OUTPATIENT
Start: 2020-09-16 | End: 2020-09-18

## 2020-09-16 RX ORDER — MAGNESIUM SULFATE 500 MG/ML
1 VIAL (ML) INJECTION ONCE
Refills: 0 | Status: COMPLETED | OUTPATIENT
Start: 2020-09-16 | End: 2020-09-16

## 2020-09-16 RX ORDER — ATORVASTATIN CALCIUM 80 MG/1
10 TABLET, FILM COATED ORAL AT BEDTIME
Refills: 0 | Status: DISCONTINUED | OUTPATIENT
Start: 2020-09-16 | End: 2020-09-18

## 2020-09-16 RX ORDER — CHLORHEXIDINE GLUCONATE 213 G/1000ML
1 SOLUTION TOPICAL ONCE
Refills: 0 | Status: DISCONTINUED | OUTPATIENT
Start: 2020-09-16 | End: 2020-09-16

## 2020-09-16 RX ORDER — SODIUM CHLORIDE 9 MG/ML
3 INJECTION INTRAMUSCULAR; INTRAVENOUS; SUBCUTANEOUS EVERY 8 HOURS
Refills: 0 | Status: DISCONTINUED | OUTPATIENT
Start: 2020-09-16 | End: 2020-09-16

## 2020-09-16 RX ORDER — CEFUROXIME AXETIL 250 MG
1500 TABLET ORAL EVERY 8 HOURS
Refills: 0 | Status: COMPLETED | OUTPATIENT
Start: 2020-09-16 | End: 2020-09-17

## 2020-09-16 RX ORDER — CLOPIDOGREL BISULFATE 75 MG/1
75 TABLET, FILM COATED ORAL DAILY
Refills: 0 | Status: DISCONTINUED | OUTPATIENT
Start: 2020-09-16 | End: 2020-09-18

## 2020-09-16 RX ORDER — LEVOTHYROXINE SODIUM 125 MCG
150 TABLET ORAL DAILY
Refills: 0 | Status: DISCONTINUED | OUTPATIENT
Start: 2020-09-16 | End: 2020-09-18

## 2020-09-16 RX ORDER — LIDOCAINE HCL 20 MG/ML
0.2 VIAL (ML) INJECTION ONCE
Refills: 0 | Status: DISCONTINUED | OUTPATIENT
Start: 2020-09-16 | End: 2020-09-16

## 2020-09-16 RX ORDER — ASPIRIN/CALCIUM CARB/MAGNESIUM 324 MG
81 TABLET ORAL DAILY
Refills: 0 | Status: DISCONTINUED | OUTPATIENT
Start: 2020-09-16 | End: 2020-09-18

## 2020-09-16 RX ORDER — HYDROCHLOROTHIAZIDE 25 MG
25 TABLET ORAL DAILY
Refills: 0 | Status: DISCONTINUED | OUTPATIENT
Start: 2020-09-16 | End: 2020-09-18

## 2020-09-16 RX ORDER — ACETAMINOPHEN 500 MG
1000 TABLET ORAL ONCE
Refills: 0 | Status: COMPLETED | OUTPATIENT
Start: 2020-09-16 | End: 2020-09-16

## 2020-09-16 RX ADMIN — Medication 400 MILLIGRAM(S): at 21:44

## 2020-09-16 RX ADMIN — Medication 100 MILLIGRAM(S): at 21:00

## 2020-09-16 RX ADMIN — Medication 1000 MILLIGRAM(S): at 22:08

## 2020-09-16 RX ADMIN — ATORVASTATIN CALCIUM 10 MILLIGRAM(S): 80 TABLET, FILM COATED ORAL at 21:44

## 2020-09-16 RX ADMIN — Medication 100 GRAM(S): at 18:58

## 2020-09-16 NOTE — PROVIDER CONTACT NOTE (OTHER) - SITUATION
1gram of Magnesium IV started by previous nurse now showing infiltration at IV site located on left wrist.

## 2020-09-16 NOTE — PROGRESS NOTE ADULT - SUBJECTIVE AND OBJECTIVE BOX
This patient has been implanted with a Transcatheter Aortic Valve Implant under the following NCT/CJ:    TAVR:    Commercial Implant NCT [id number], CJ N/A and will be placed into the TVT Registry.    MICHAEL Sanabria  [id number]

## 2020-09-16 NOTE — PROGRESS NOTE ADULT - SUBJECTIVE AND OBJECTIVE BOX
Subjective ' hello I am ok now"  VITAL SIGNS    Telemetry:  Sinus Darren 50 PAC PJC    Vital Signs Last 24 Hrs  T(C): 36.4 (09-16-20 @ 15:30), Max: 36.4 (09-16-20 @ 10:49)  T(F): 97.5 (09-16-20 @ 15:30), Max: 97.5 (09-16-20 @ 10:49)  HR: 50 (09-16-20 @ 17:20) (50 - 78)  BP: 134/61 (09-16-20 @ 17:20) (100/53 - 190/65)  RR: 18 (09-16-20 @ 17:20) (17 - 18)  SpO2: 98% (09-16-20 @ 17:20) (92% - 100%)           MEDICATIONS  (STANDING):  aspirin enteric coated 81 milliGRAM(s) Oral daily  cefuroxime  IVPB 1500 milliGRAM(s) IV Intermittent every 8 hours  cefuroxime  IVPB 1500 milliGRAM(s) IV Intermittent once  clopidogrel Tablet 75 milliGRAM(s) Oral daily  hydrochlorothiazide 25 milliGRAM(s) Oral daily    MEDICATIONS  (PRN)      POCT Blood Glucose.: 114 mg/dL (16 Sep 2020 10:28)        PHYSICAL EXAM  Neurology: alert and oriented x 3, nonfocal, no gross deficits  CV S1 S2RR   Lungs :B/l breath sound on room air    Abdomen: soft, nontender, nondistended, positive bowel sounds, last bowel movement preop    :voids               Extremities:  equal strength throughout     B/lle warm well perfused + DP  Right groin benign CDI nontender no hematoma  with green suture  left groin benign CDI nontender no hematoma                                      Discussed with Cardiothoracic Team at AM rounds.

## 2020-09-16 NOTE — PROGRESS NOTE ADULT - ASSESSMENT
This is  87 y/o male, poor historian, with h/o gout, HTN, BPH, hypothyroid, adenocarcinoma of lung (S/P VATS RLL resection 2014 & LLL resection 2015), c/o worsening exertional dyspnea, fatigue and functional decline. Denies angina, edema, syncope, fever or chills. Recent Echo completed 7/28/2020 revealed severe aortic stenosis.  9/16TAVR, percutaneous femoral approach, 4 #26 Medtronic Evolute Core  develop CHB underwnt MICRA PPM with Dr Wells.  Recovered in CRS stabilized transferred to 2 [facility] groin sites benign stable + DP. Hydrochlorothiazide 25mg  for HTN. MG one gram IV for PACand PJC. ECHO XRAY and EKG in am

## 2020-09-16 NOTE — PROGRESS NOTE ADULT - SUBJECTIVE AND OBJECTIVE BOX
Pre-op Diagnosis:  CHB post TAVR (preop RBBB)    Post-op Diagnosis: same    Procedure: 2 physician consent (Lyubov Adkins and Doroteo) Micra AV    Electrophysiologist: Russell    Anesthesia: Manuel Coker    Access: RFV (obtained during TAVR by Dr Adkins)    Description: Micra to the RVOT    Complications: none    EBL: < 10 cc    Disposition: CRS    Plan: Remove hemostatic suture at 3 am (12 hours)

## 2020-09-16 NOTE — PROGRESS NOTE ADULT - PROBLEM SELECTOR PLAN 1
TELE  ECHO in am  EKG in am  ASA PLavix   HCTZ 25 mg po for HTN  synthroid 150 mcg -home dose  resume home meds as tolerated

## 2020-09-16 NOTE — BRIEF OPERATIVE NOTE - NSICDXBRIEFPROCEDURE_GEN_ALL_CORE_FT
PROCEDURES:  TAVR, percutaneous femoral approach, using prosthetic valve 16-Sep-2020 14:43:34 #26 Medtronic Evolute Core valve Kedar Call

## 2020-09-17 LAB
ANION GAP SERPL CALC-SCNC: 11 MMOL/L — SIGNIFICANT CHANGE UP (ref 5–17)
BASOPHILS # BLD AUTO: 0.03 K/UL — SIGNIFICANT CHANGE UP (ref 0–0.2)
BASOPHILS NFR BLD AUTO: 0.5 % — SIGNIFICANT CHANGE UP (ref 0–2)
BUN SERPL-MCNC: 21 MG/DL — SIGNIFICANT CHANGE UP (ref 7–23)
CALCIUM SERPL-MCNC: 9.1 MG/DL — SIGNIFICANT CHANGE UP (ref 8.4–10.5)
CHLORIDE SERPL-SCNC: 103 MMOL/L — SIGNIFICANT CHANGE UP (ref 96–108)
CO2 SERPL-SCNC: 21 MMOL/L — LOW (ref 22–31)
CREAT SERPL-MCNC: 1.41 MG/DL — HIGH (ref 0.5–1.3)
EOSINOPHIL # BLD AUTO: 0.15 K/UL — SIGNIFICANT CHANGE UP (ref 0–0.5)
EOSINOPHIL NFR BLD AUTO: 2.6 % — SIGNIFICANT CHANGE UP (ref 0–6)
GLUCOSE SERPL-MCNC: 102 MG/DL — HIGH (ref 70–99)
HCT VFR BLD CALC: 26.8 % — LOW (ref 39–50)
HGB BLD-MCNC: 8.2 G/DL — LOW (ref 13–17)
IMM GRANULOCYTES NFR BLD AUTO: 0.4 % — SIGNIFICANT CHANGE UP (ref 0–1.5)
LYMPHOCYTES # BLD AUTO: 0.56 K/UL — LOW (ref 1–3.3)
LYMPHOCYTES # BLD AUTO: 9.9 % — LOW (ref 13–44)
MCHC RBC-ENTMCNC: 26.3 PG — LOW (ref 27–34)
MCHC RBC-ENTMCNC: 30.6 GM/DL — LOW (ref 32–36)
MCV RBC AUTO: 85.9 FL — SIGNIFICANT CHANGE UP (ref 80–100)
MONOCYTES # BLD AUTO: 0.45 K/UL — SIGNIFICANT CHANGE UP (ref 0–0.9)
MONOCYTES NFR BLD AUTO: 7.9 % — SIGNIFICANT CHANGE UP (ref 2–14)
NEUTROPHILS # BLD AUTO: 4.47 K/UL — SIGNIFICANT CHANGE UP (ref 1.8–7.4)
NEUTROPHILS NFR BLD AUTO: 78.7 % — HIGH (ref 43–77)
NRBC # BLD: 0 /100 WBCS — SIGNIFICANT CHANGE UP (ref 0–0)
PLATELET # BLD AUTO: 131 K/UL — LOW (ref 150–400)
POTASSIUM SERPL-MCNC: 4.1 MMOL/L — SIGNIFICANT CHANGE UP (ref 3.5–5.3)
POTASSIUM SERPL-SCNC: 4.1 MMOL/L — SIGNIFICANT CHANGE UP (ref 3.5–5.3)
RBC # BLD: 3.12 M/UL — LOW (ref 4.2–5.8)
RBC # FLD: 19.9 % — HIGH (ref 10.3–14.5)
SODIUM SERPL-SCNC: 135 MMOL/L — SIGNIFICANT CHANGE UP (ref 135–145)
WBC # BLD: 5.68 K/UL — SIGNIFICANT CHANGE UP (ref 3.8–10.5)
WBC # FLD AUTO: 5.68 K/UL — SIGNIFICANT CHANGE UP (ref 3.8–10.5)

## 2020-09-17 PROCEDURE — 71046 X-RAY EXAM CHEST 2 VIEWS: CPT | Mod: 26

## 2020-09-17 PROCEDURE — 99232 SBSQ HOSP IP/OBS MODERATE 35: CPT

## 2020-09-17 PROCEDURE — 99233 SBSQ HOSP IP/OBS HIGH 50: CPT

## 2020-09-17 PROCEDURE — 93306 TTE W/DOPPLER COMPLETE: CPT | Mod: 26

## 2020-09-17 PROCEDURE — 93010 ELECTROCARDIOGRAM REPORT: CPT

## 2020-09-17 RX ORDER — HYDRALAZINE HCL 50 MG
10 TABLET ORAL ONCE
Refills: 0 | Status: COMPLETED | OUTPATIENT
Start: 2020-09-17 | End: 2020-09-17

## 2020-09-17 RX ORDER — ALPRAZOLAM 0.25 MG
0.25 TABLET ORAL ONCE
Refills: 0 | Status: DISCONTINUED | OUTPATIENT
Start: 2020-09-17 | End: 2020-09-17

## 2020-09-17 RX ORDER — OXYCODONE AND ACETAMINOPHEN 5; 325 MG/1; MG/1
1 TABLET ORAL ONCE
Refills: 0 | Status: DISCONTINUED | OUTPATIENT
Start: 2020-09-17 | End: 2020-09-17

## 2020-09-17 RX ADMIN — Medication 25 MILLIGRAM(S): at 05:50

## 2020-09-17 RX ADMIN — OXYCODONE AND ACETAMINOPHEN 1 TABLET(S): 5; 325 TABLET ORAL at 04:34

## 2020-09-17 RX ADMIN — OXYCODONE AND ACETAMINOPHEN 1 TABLET(S): 5; 325 TABLET ORAL at 03:44

## 2020-09-17 RX ADMIN — Medication 10 MILLIGRAM(S): at 03:44

## 2020-09-17 RX ADMIN — Medication 81 MILLIGRAM(S): at 12:52

## 2020-09-17 RX ADMIN — Medication 3 MILLIGRAM(S): at 22:41

## 2020-09-17 RX ADMIN — Medication 150 MICROGRAM(S): at 05:50

## 2020-09-17 RX ADMIN — ATORVASTATIN CALCIUM 10 MILLIGRAM(S): 80 TABLET, FILM COATED ORAL at 22:41

## 2020-09-17 RX ADMIN — Medication 100 MILLIGRAM(S): at 03:45

## 2020-09-17 RX ADMIN — Medication 10 MILLIGRAM(S): at 00:09

## 2020-09-17 RX ADMIN — Medication 0.25 MILLIGRAM(S): at 00:09

## 2020-09-17 RX ADMIN — CLOPIDOGREL BISULFATE 75 MILLIGRAM(S): 75 TABLET, FILM COATED ORAL at 12:51

## 2020-09-17 NOTE — PROGRESS NOTE ADULT - ATTENDING COMMENTS
No acute events reported overnight.  The patient is clinically doing well.  He denies any chest pain/tightness/discomfort, fevers, chills, sweats, light-headed sensation, dizziness or palpitations.  No pain in his groin sites bilaterally.  Telemetry demonstrates sinus rhythm with rates in the 60s.  EKG NSRS with sinus arrhythmia with RBBB , , QTc 487 (RBBB was seen on prior TTE).  Continue aspirin/clopidogrel.  Signs/symptoms of bleeding were reviewed.  TTE was personally reviewed with mild paravalvular leak with normal functioning TAVR valve.  No need for MCOT since a micra was placed at the end of the TAVR procedure due to CHB.  Resume home medications as tolerated.  If he continues to clinically do well plan for discharge tomorrow.    All questions and concerns of the patient were addressed.

## 2020-09-17 NOTE — PROGRESS NOTE ADULT - ASSESSMENT
This is  89 y/o male, poor historian, with h/o gout, HTN, BPH, hypothyroid, adenocarcinoma of lung (S/P VATS RLL resection 2014 & LLL resection 2015), c/o worsening exertional dyspnea, fatigue and functional decline. Denies angina, edema, syncope, fever or chills. Recent Echo completed 7/28/2020 revealed severe aortic stenosis.  9/16TAVR, percutaneous femoral approach, 4 #26 Medtronic Evolute Core developed CHB underwent MICRA PPM with Dr Wells.    1. CHB  - S/p MICRA AV PPM (MDT)   - F/u CXR   - Post op PPM interrogation done and device paired by (MDT) rep; Normal device function  - PPM ID card and booklet given to patient, teaching enforced and patient verbalized understanding  - Wound check appt scheduled for 09/30/20 at 1300.  - Clear from EP perspective for discharge planning pending official CXR read    [id number]

## 2020-09-17 NOTE — PROGRESS NOTE ADULT - SUBJECTIVE AND OBJECTIVE BOX
24H hour events:   S/p MICRA AV PPM  w/EP attending MD Wells. Denies chest pain, palpitations, dizziness, lightheadedness, and shortness of breath.         MEDICATIONS:  aspirin enteric coated 81 milliGRAM(s) Oral daily  clopidogrel Tablet 75 milliGRAM(s) Oral daily  hydrochlorothiazide 25 milliGRAM(s) Oral daily  cefuroxime  IVPB 1500 milliGRAM(s) IV Intermittent once  melatonin 3 milliGRAM(s) Oral at bedtime  atorvastatin 10 milliGRAM(s) Oral at bedtime  levothyroxine 150 MICROGram(s) Oral daily  influenza   Vaccine 0.5 milliLiter(s) IntraMuscular once      REVIEW OF SYSTEMS:  Complete 10point ROS negative.    PHYSICAL EXAM:  T(C): 36.7 (20 @ 07:24), Max: 36.7 (20 @ 04:23)  HR: 70 (20 @ 07:24) (50 - 78)  BP: 180/74 (20 @ 07:24) (100/53 - 190/65)  RR: 18 (20 @ 07:24) (16 - 18)  SpO2: 99% (20 @ 07:24) (92% - 100%)  Wt(kg): --  I&O's Summary    16 Sep 2020 07:  -  17 Sep 2020 07:00  --------------------------------------------------------  IN: 100 mL / OUT: 100 mL / NET: 0 mL    17 Sep 2020 07:  -  17 Sep 2020 10:53  --------------------------------------------------------  IN: 0 mL / OUT: 200 mL / NET: -200 mL        Appearance: Normal	  Cardiovascular: Normal S1 S2, No JVD, No murmurs, No edema  Respiratory: Lungs clear to auscultation	  Psychiatry: A & O x 3, Mood & affect appropriate  Skin: No rashes, No ecchymoses, No cyanosis	  Extremities: Normal range of motion, No clubbing, cyanosis or edema. Right groin site stable, no hematoma, oozing, or swelling  Vascular: Peripheral pulses palpable 2+ bilaterally        LABS:	 	                        8.2    5.68  )-----------( 131      ( 17 Sep 2020 05:47 )             26.8         135  |  103  |  21  ----------------------------<  102<H>  4.1   |  21<L>  |  1.41<H>        137  |  102  |  23  ----------------------------<  115<H>  4.9   |  22  |  1.41<H>    Ca    9.1      17 Sep 2020 05:46  Ca    9.4      16 Sep 2020 16:23    TPro  6.9  /  Alb  4.1  /  TBili  0.3  /  DBili  x   /  AST  22  /  ALT  10  /  AlkPhos  71  09-16    PT/INR - ( 16 Sep 2020 16:23 )   PT: 12.2 sec;   INR: 1.03 ratio    PTT - ( 16 Sep 2020 16:23 )  PTT:27.3 sec          TELEMETRY: SB  40-50 	    EC20 SB 64bpm

## 2020-09-17 NOTE — PROGRESS NOTE ADULT - SUBJECTIVE AND OBJECTIVE BOX
Structural Heart Team    Mr Lindsey has no complaints.  He is sitting up in a chair and has ambulated a little.  He denies any groin pain as well as chest pain, dizziness, and sob.  He had no acute events overnight.       REVIEW OF SYSTEMS:    CONSTITUTIONAL: No weakness, fevers or chills  EYES/ENT: No visual changes;  No vertigo or throat pain   NECK: No pain or stiffness  RESPIRATORY: No cough, wheezing, hemoptysis; No shortness of breath  CARDIOVASCULAR: No chest pain or palpitations  GASTROINTESTINAL: No abdominal or epigastric pain. No nausea, vomiting, or hematemesis; No diarrhea or constipation. No melena or hematochezia.  GENITOURINARY: No dysuria, frequency or hematuria  NEUROLOGICAL: No numbness or weakness  SKIN: No itching, rashes      Allergies    No Known Allergies    Intolerances      Vital Signs Last 24 Hrs  T(C): 36.7 (17 Sep 2020 07:24), Max: 36.7 (17 Sep 2020 04:23)  T(F): 98 (17 Sep 2020 07:24), Max: 98 (17 Sep 2020 04:23)  HR: 70 (17 Sep 2020 07:24) (50 - 78)  BP: 180/74 (17 Sep 2020 07:24) (100/53 - 180/76)  BP(mean): 106 (17 Sep 2020 07:24) (93 - 109)  RR: 18 (17 Sep 2020 07:24) (16 - 18)  SpO2: 99% (17 Sep 2020 07:24) (92% - 99%)    MEDICATIONS  (STANDING):  aspirin enteric coated 81 milliGRAM(s) Oral daily  atorvastatin 10 milliGRAM(s) Oral at bedtime  cefuroxime  IVPB 1500 milliGRAM(s) IV Intermittent once  clopidogrel Tablet 75 milliGRAM(s) Oral daily  hydrochlorothiazide 25 milliGRAM(s) Oral daily  influenza   Vaccine 0.5 milliLiter(s) IntraMuscular once  levothyroxine 150 MICROGram(s) Oral daily  melatonin 3 milliGRAM(s) Oral at bedtime      Exam-  General: NAD  Cor: s1s2, no murmurs  EKG: RBBB  Pulm: CTA b/l, no w/r/r  Gastointestinal: soft, nontender, nondistended, +bowel sounds  Extremities: no edema, 1+ DP pulses  Groin: R dressing in place and dry, L dressing removed- clean with scant serosanguinous.  B/l: nontender, soft, no hematomas  Neuro: A&Ox3, nonfocal                          8.2    5.68  )-----------( 131      ( 17 Sep 2020 05:47 )             26.8   09-17    135  |  103  |  21  ----------------------------<  102<H>  4.1   |  21<L>  |  1.41<H>    Ca    9.1      17 Sep 2020 05:46    TPro  6.9  /  Alb  4.1  /  TBili  0.3  /  DBili  x   /  AST  22  /  ALT  10  /  AlkPhos  71  09-16  PT/INR - ( 16 Sep 2020 16:23 )   PT: 12.2 sec;   INR: 1.03 ratio         PTT - ( 16 Sep 2020 16:23 )  PTT:27.3 sec  I&O's Summary    16 Sep 2020 07:01  -  17 Sep 2020 07:00  --------------------------------------------------------  IN: 100 mL / OUT: 100 mL / NET: 0 mL    17 Sep 2020 07:01  -  17 Sep 2020 13:05  --------------------------------------------------------  IN: 0 mL / OUT: 200 mL / NET: -200 mL      < from: Transthoracic Echocardiogram (09.17.20 @ 10:37) >  Dimensions:    Normal Values:  LA:     4.4    2.0 - 4.0 cm  Ao:     3.8    2.0 - 3.8 cm  SEPTUM: 1.3    0.6 - 1.2 cm  PWT:    1.0    0.6 - 1.1 cm  LVIDd:  4.0    3.0 - 5.6 cm  LVIDs:  2.0    1.8 - 4.0 cm  Derived variables:  LVMI: 85 g/m2  RWT: 0.50  Fractional short: 50 %  EF (Visual Estimate): >75 %  Doppler Peak Velocity (m/sec): AoV=2.1  ------------------------------------------------------------------------  Observations:  Mitral Valve: Normal mitral valve. Minimal mitral  regurgitation.  Aortic Valve/Aorta: Transcatheter aortic valve replacement.  Peak transaortic valve gradient equals 18 mm Hg, mean  transaortic valve gradient equals 10 mm Hg, which is  probably normal in the presence of a transcatheter aortic  valve replacement. Minimal paravalvular aortic  regurgitation. Peak left ventricular outflow tract gradient  equals 14 mm Hg, LVOT velocity time integral equals 34 cm.  Aortic Root: 3.8 cm.  LVOT diameter: 2.1 cm.  Left Atrium: Mildly dilated left atrium.  LA volume index =  37 cc/m2. Mobile interatrial septum.  Left Ventricle: Hyperdynamic left ventricular systolic  function. Increased relative wall thickness with normal  left ventricular mass index, consistent with concentric  left ventricular remodeling. Indeterminate diastolic  function.  Right Heart: Normal right atrium. Normal right ventricular  size and function. Normal tricuspid valve. Minimal  tricuspid regurgitation. Normal pulmonic valve.  Pericardium/Pleura: Normal pericardium with no pericardial  effusion.  Hemodynamic: Estimated right ventricular systolic pressure  equals 28 mm Hg, assuming right atrial pressure equals 3 mm  Hg, consistent with normal pulmonary pressures.  ------------------------------------------------------------------------  Conclusions:  1. Normal mitral valve. Minimal mitral regurgitation.  2. Transcatheter aortic valve replacement. Peak transaortic  valve gradient equals 18 mm Hg, mean transaortic valve  gradient equals 10 mm Hg, which is probably normal in the  presence of a transcatheter aortic valve replacement.  Minimal paravalvular aortic regurgitation.  3. Hyperdynamic left ventricular systolic function.  4. Normal right ventricular size and function.  *** Compared with echocardiogram of 9/16/2020, results are  similar to post-TAVR images.  ------------------------------------------------------------------------  Confirmed on  9/17/2020 - 11:41:28 by Linden Do M.D.  ------------------------------------------------------------------------    < end of copied text >          Assessment/Plan:  Mr Lindsey is POD 1 s/p TF TAVR (#26 CV) complicated by intraop CHB requiring PPM (Micra)  - asa/plavix  - resume preop meds  - ambulate as tolerated  - post TAVR TTE done and noted  - daily EKG's  - likely d/c home tomorrow, no need for MCOT due to pacemaker    - Discharge plan: follow up with Dr. Sadler in one week and follow up with Structural Heart Team in one month.  Echo will be done at 1 month follow up visit.  Plan discussed with patient     Jace Iqbal, PA  [phone]

## 2020-09-17 NOTE — PROGRESS NOTE ADULT - SUBJECTIVE AND OBJECTIVE BOX
im ok    VITAL SIGNS    Telemetry:  sb 40-60    Vital Signs Last 24 Hrs  T(C): 36.7 (09-17-20 @ 07:24), Max: 36.7 (09-17-20 @ 04:23)  T(F): 98 (09-17-20 @ 07:24), Max: 98 (09-17-20 @ 04:23)  HR: 70 (09-17-20 @ 07:24) (50 - 78)  BP: 180/74 (09-17-20 @ 07:24) (100/53 - 190/65)  RR: 18 (09-17-20 @ 07:24) (16 - 18)  SpO2: 99% (09-17-20 @ 07:24) (92% - 100%)                   09-16 @ 07:01  -  09-17 @ 07:00  --------------------------------------------------------  IN: 100 mL / OUT: 100 mL / NET: 0 mL          Daily Height in cm: 172.72 (16 Sep 2020 12:26)    Daily             CAPILLARY BLOOD GLUCOSE      POCT Blood Glucose.: 114 mg/dL (16 Sep 2020 10:28)            Drains:        Pacing Wires        Coumadin no                                PHYSICAL EXAM        Neurology: alert and oriented x 3, nonfocal, no gross deficits  CV : s1 s2 RRR  Lungs: rhonchi bilat.  Bronchial loer lobes  Abdomen: soft, nontender, nondistended, positive bowel sounds, last bowel movement                       chest tubes  :    voiding   Extremities:     neg  edema   /  -   calve tenderness ,    bilat groins cdi          aspirin enteric coated 81 milliGRAM(s) Oral daily  atorvastatin 10 milliGRAM(s) Oral at bedtime  cefuroxime  IVPB 1500 milliGRAM(s) IV Intermittent once  clopidogrel Tablet 75 milliGRAM(s) Oral daily  hydrochlorothiazide 25 milliGRAM(s) Oral daily  levothyroxine 150 MICROGram(s) Oral daily  melatonin 3 milliGRAM(s) Oral at bedtime                    Physical Therapy Rec:   Home  [  ]   Home w/ PT  [  ]  Rehab  [  ]  Discussed with Cardiothoracic Team at AM rounds.

## 2020-09-17 NOTE — PROGRESS NOTE ADULT - ASSESSMENT
This is  89 y/o male, poor historian, with h/o gout, HTN, BPH, hypothyroid, adenocarcinoma of lung (S/P VATS RLL resection 2014 & LLL resection 2015), c/o worsening exertional dyspnea, fatigue and functional decline. Denies angina, edema, syncope, fever or chills. Recent Echo completed 7/28/2020 revealed severe aortic stenosis.  9/16TAVR, percutaneous femoral approach, 4 #26 Medtronic Evolute Core    develop CHB underwnt MICRA PPM with Dr Wells.  Recovered in CRS stabilized transferred to 2 [facility] sites benign stable + DP. Hydrochlorothiazide 25mg  for HTN. MG one gram IV for PACand PJC. ECHO XRAY and EKG in am  9/17 Echo today  No events overnight  R groin stitch is out  TRansfer to floor

## 2020-09-18 ENCOUNTER — TRANSCRIPTION ENCOUNTER (OUTPATIENT)
Age: 85
End: 2020-09-18

## 2020-09-18 ENCOUNTER — NON-APPOINTMENT (OUTPATIENT)
Age: 85
End: 2020-09-18

## 2020-09-18 VITALS
OXYGEN SATURATION: 99 % | SYSTOLIC BLOOD PRESSURE: 153 MMHG | RESPIRATION RATE: 18 BRPM | DIASTOLIC BLOOD PRESSURE: 67 MMHG

## 2020-09-18 LAB
ANION GAP SERPL CALC-SCNC: 8 MMOL/L — SIGNIFICANT CHANGE UP (ref 5–17)
BASOPHILS # BLD AUTO: 0.03 K/UL — SIGNIFICANT CHANGE UP (ref 0–0.2)
BASOPHILS NFR BLD AUTO: 0.6 % — SIGNIFICANT CHANGE UP (ref 0–2)
BUN SERPL-MCNC: 28 MG/DL — HIGH (ref 7–23)
CALCIUM SERPL-MCNC: 9.1 MG/DL — SIGNIFICANT CHANGE UP (ref 8.4–10.5)
CHLORIDE SERPL-SCNC: 103 MMOL/L — SIGNIFICANT CHANGE UP (ref 96–108)
CO2 SERPL-SCNC: 22 MMOL/L — SIGNIFICANT CHANGE UP (ref 22–31)
CREAT SERPL-MCNC: 1.67 MG/DL — HIGH (ref 0.5–1.3)
EOSINOPHIL # BLD AUTO: 0.27 K/UL — SIGNIFICANT CHANGE UP (ref 0–0.5)
EOSINOPHIL NFR BLD AUTO: 5 % — SIGNIFICANT CHANGE UP (ref 0–6)
GLUCOSE SERPL-MCNC: 111 MG/DL — HIGH (ref 70–99)
HCT VFR BLD CALC: 23.6 % — LOW (ref 39–50)
HGB BLD-MCNC: 7.3 G/DL — LOW (ref 13–17)
IMM GRANULOCYTES NFR BLD AUTO: 0.4 % — SIGNIFICANT CHANGE UP (ref 0–1.5)
LYMPHOCYTES # BLD AUTO: 0.8 K/UL — LOW (ref 1–3.3)
LYMPHOCYTES # BLD AUTO: 14.9 % — SIGNIFICANT CHANGE UP (ref 13–44)
MCHC RBC-ENTMCNC: 26.6 PG — LOW (ref 27–34)
MCHC RBC-ENTMCNC: 30.9 GM/DL — LOW (ref 32–36)
MCV RBC AUTO: 86.1 FL — SIGNIFICANT CHANGE UP (ref 80–100)
MONOCYTES # BLD AUTO: 0.68 K/UL — SIGNIFICANT CHANGE UP (ref 0–0.9)
MONOCYTES NFR BLD AUTO: 12.7 % — SIGNIFICANT CHANGE UP (ref 2–14)
NEUTROPHILS # BLD AUTO: 3.56 K/UL — SIGNIFICANT CHANGE UP (ref 1.8–7.4)
NEUTROPHILS NFR BLD AUTO: 66.4 % — SIGNIFICANT CHANGE UP (ref 43–77)
NRBC # BLD: 0 /100 WBCS — SIGNIFICANT CHANGE UP (ref 0–0)
PLATELET # BLD AUTO: 123 K/UL — LOW (ref 150–400)
POTASSIUM SERPL-MCNC: 3.9 MMOL/L — SIGNIFICANT CHANGE UP (ref 3.5–5.3)
POTASSIUM SERPL-SCNC: 3.9 MMOL/L — SIGNIFICANT CHANGE UP (ref 3.5–5.3)
RBC # BLD: 2.74 M/UL — LOW (ref 4.2–5.8)
RBC # FLD: 19.7 % — HIGH (ref 10.3–14.5)
SODIUM SERPL-SCNC: 133 MMOL/L — LOW (ref 135–145)
WBC # BLD: 5.36 K/UL — SIGNIFICANT CHANGE UP (ref 3.8–10.5)
WBC # FLD AUTO: 5.36 K/UL — SIGNIFICANT CHANGE UP (ref 3.8–10.5)

## 2020-09-18 PROCEDURE — 93926 LOWER EXTREMITY STUDY: CPT

## 2020-09-18 PROCEDURE — 82962 GLUCOSE BLOOD TEST: CPT

## 2020-09-18 PROCEDURE — 86923 COMPATIBILITY TEST ELECTRIC: CPT

## 2020-09-18 PROCEDURE — 85610 PROTHROMBIN TIME: CPT

## 2020-09-18 PROCEDURE — 85027 COMPLETE CBC AUTOMATED: CPT

## 2020-09-18 PROCEDURE — 80053 COMPREHEN METABOLIC PANEL: CPT

## 2020-09-18 PROCEDURE — 93010 ELECTROCARDIOGRAM REPORT: CPT

## 2020-09-18 PROCEDURE — L8699: CPT

## 2020-09-18 PROCEDURE — 85025 COMPLETE CBC W/AUTO DIFF WBC: CPT

## 2020-09-18 PROCEDURE — 80048 BASIC METABOLIC PNL TOTAL CA: CPT

## 2020-09-18 PROCEDURE — 86901 BLOOD TYPING SEROLOGIC RH(D): CPT

## 2020-09-18 PROCEDURE — 93880 EXTRACRANIAL BILAT STUDY: CPT

## 2020-09-18 PROCEDURE — 93355 ECHO TRANSESOPHAGEAL (TEE): CPT

## 2020-09-18 PROCEDURE — C1786: CPT

## 2020-09-18 PROCEDURE — C1760: CPT

## 2020-09-18 PROCEDURE — C1889: CPT

## 2020-09-18 PROCEDURE — U0003: CPT

## 2020-09-18 PROCEDURE — 86900 BLOOD TYPING SEROLOGIC ABO: CPT

## 2020-09-18 PROCEDURE — 87641 MR-STAPH DNA AMP PROBE: CPT

## 2020-09-18 PROCEDURE — 93926 LOWER EXTREMITY STUDY: CPT | Mod: 26,RT

## 2020-09-18 PROCEDURE — C1769: CPT

## 2020-09-18 PROCEDURE — 99232 SBSQ HOSP IP/OBS MODERATE 35: CPT

## 2020-09-18 PROCEDURE — 71046 X-RAY EXAM CHEST 2 VIEWS: CPT

## 2020-09-18 PROCEDURE — C1894: CPT

## 2020-09-18 PROCEDURE — 93005 ELECTROCARDIOGRAM TRACING: CPT

## 2020-09-18 PROCEDURE — 83036 HEMOGLOBIN GLYCOSYLATED A1C: CPT

## 2020-09-18 PROCEDURE — G0463: CPT

## 2020-09-18 PROCEDURE — 93306 TTE W/DOPPLER COMPLETE: CPT

## 2020-09-18 PROCEDURE — 86850 RBC ANTIBODY SCREEN: CPT

## 2020-09-18 PROCEDURE — 87640 STAPH A DNA AMP PROBE: CPT

## 2020-09-18 PROCEDURE — 76000 FLUOROSCOPY <1 HR PHYS/QHP: CPT

## 2020-09-18 PROCEDURE — C1887: CPT

## 2020-09-18 PROCEDURE — 85730 THROMBOPLASTIN TIME PARTIAL: CPT

## 2020-09-18 RX ORDER — FERROUS SULFATE 325(65) MG
1 TABLET ORAL
Qty: 90 | Refills: 0
Start: 2020-09-18 | End: 2020-10-17

## 2020-09-18 RX ORDER — ASCORBIC ACID 60 MG
1 TABLET,CHEWABLE ORAL
Qty: 0 | Refills: 0 | DISCHARGE
Start: 2020-09-18

## 2020-09-18 RX ORDER — ASCORBIC ACID 60 MG
500 TABLET,CHEWABLE ORAL DAILY
Refills: 0 | Status: DISCONTINUED | OUTPATIENT
Start: 2020-09-18 | End: 2020-09-18

## 2020-09-18 RX ORDER — LOSARTAN POTASSIUM 100 MG/1
50 TABLET, FILM COATED ORAL DAILY
Refills: 0 | Status: DISCONTINUED | OUTPATIENT
Start: 2020-09-18 | End: 2020-09-18

## 2020-09-18 RX ORDER — FERROUS SULFATE 325(65) MG
325 TABLET ORAL THREE TIMES A DAY
Refills: 0 | Status: DISCONTINUED | OUTPATIENT
Start: 2020-09-18 | End: 2020-09-18

## 2020-09-18 RX ORDER — FOLIC ACID 0.8 MG
1 TABLET ORAL DAILY
Refills: 0 | Status: DISCONTINUED | OUTPATIENT
Start: 2020-09-18 | End: 2020-09-18

## 2020-09-18 RX ORDER — FOLIC ACID 0.8 MG
1 TABLET ORAL
Qty: 30 | Refills: 0
Start: 2020-09-18 | End: 2020-10-17

## 2020-09-18 RX ADMIN — Medication 325 MILLIGRAM(S): at 11:18

## 2020-09-18 RX ADMIN — LOSARTAN POTASSIUM 50 MILLIGRAM(S): 100 TABLET, FILM COATED ORAL at 11:16

## 2020-09-18 RX ADMIN — Medication 1 MILLIGRAM(S): at 11:16

## 2020-09-18 RX ADMIN — Medication 25 MILLIGRAM(S): at 05:02

## 2020-09-18 RX ADMIN — Medication 81 MILLIGRAM(S): at 11:17

## 2020-09-18 RX ADMIN — Medication 500 MILLIGRAM(S): at 11:16

## 2020-09-18 RX ADMIN — Medication 150 MICROGRAM(S): at 05:02

## 2020-09-18 NOTE — DISCHARGE NOTE PROVIDER - NSDCCPCAREPLAN_GEN_ALL_CORE_FT
PRINCIPAL DISCHARGE DIAGNOSIS  Diagnosis: Aortic stenosis  Assessment and Plan of Treatment: sp  TAVR      SECONDARY DISCHARGE DIAGNOSES  Diagnosis: Hypertension  Assessment and Plan of Treatment: BP  medication

## 2020-09-18 NOTE — DISCHARGE NOTE PROVIDER - HOSPITAL COURSE
This is  89 y/o male, poor historian, with h/o gout, HTN, BPH, hypothyroid, adenocarcinoma of lung (S/P VATS RLL resection 2014 & LLL resection 2015), c/o worsening exertional dyspnea, fatigue and functional decline. Denies angina, edema, syncope, fever or chills. Recent Echo completed 7/28/2020 revealed severe aortic stenosis.  9/16TAVR, percutaneous femoral approach, 4 #26 Medtronic Evolute Core    develop CHB underwnt MICRA PPM with Dr Wells.  [facility]. Hydrochlorothiazide 25mg  for HTN. MG one gram IV for PACand PJC. ECHO XRAY and EKG in am  9/17 Echo today  No events overnight  R groin stitch is   9/18   hct  to 23   vss,  groin US  negative    dc home
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
## 2020-09-18 NOTE — DISCHARGE NOTE PROVIDER - NSDCFUSCHEDAPPT_GEN_ALL_CORE_FT
DELPHINE PEDERSEN ; 09/22/2020 ; NPP CT Surg [address] DELPHINE Puente ; 09/30/2020 ; NPP Cardio Electro [address] DELPHINE Puente ; 10/12/2020 ; NPP ThorSurg [address]  DELPHINE PEDERSEN ; 10/20/2020 ; NPP Cardio [facility]

## 2020-09-18 NOTE — DISCHARGE NOTE PROVIDER - NSDCMRMEDTOKEN_GEN_ALL_CORE_FT
allopurinol 300 mg oral tablet: 1 tab(s) orally once a day AM  ascorbic acid 500 mg oral tablet: 1 tab(s) orally once a day  aspirin 81 mg oral delayed release tablet: 1 tab(s) orally once a day  Calcium 500+D: 1 tab(s) orally once a day  dorzolamide-timolol 2.23%-0.68% ophthalmic solution: 1 drop(s) to each affected right eye once a day  ferrous sulfate 325 mg (65 mg elemental iron) oral tablet: 1 tab(s) orally 3 times a day   folic acid 1 mg oral tablet: 1 tab(s) orally once a day  HCTZ 50/triamterene 75 mg oral daily:     levothyroxine 150 mcg (0.15 mg) oral tablet: 1 tab(s) orally once a day  losartan 50 mg oral tablet: 1 tab(s) orally once a day  lovastatin 20 mg oral tablet: 1 tab(s) orally once a day  rabeprazole 20 mg oral delayed release tablet: 1 tab(s) orally once a day  verapamil 120 mg/12 hours oral tablet, extended release: 1 tab(s) orally once a day (in the morning)

## 2020-09-18 NOTE — DISCHARGE NOTE NURSING/CASE MANAGEMENT/SOCIAL WORK - PATIENT PORTAL LINK FT
You can access the FollowMyHealth Patient Portal offered by [facility] by registering at the following website: [email or web address]. By joining [facility]’s FollowMyHealth portal, you will also be able to view your health information using other applications (apps) compatible with our system.

## 2020-09-18 NOTE — DISCHARGE NOTE PROVIDER - CARE PROVIDER_API CALL
Braden Sadlre  THORACIC AND CARDIAC SURGERY  [address] [place] [address]  Phone: [phone]  Fax: [phone]  Follow Up Time:     Terrance Adkins  INTERVENTIONAL CARDIOLOGY  [address] [place] [address]  Phone: [phone]  Fax: [phone]  Follow Up Time:

## 2020-09-18 NOTE — DISCHARGE NOTE PROVIDER - NSDCACTIVITY_GEN_ALL_CORE
Walking - Indoors allowed/Walking - Outdoors allowed/No heavy lifting/straining/Do not make important decisions/Driving allowed/Stairs allowed

## 2020-09-18 NOTE — DISCHARGE NOTE PROVIDER - CARE PROVIDERS DIRECT ADDRESSES
[email or web address]

## 2020-09-19 ENCOUNTER — TRANSCRIPTION ENCOUNTER (OUTPATIENT)
Age: 85
End: 2020-09-19

## 2020-09-19 RX ORDER — ASPIRIN/CALCIUM CARB/MAGNESIUM 324 MG
1 TABLET ORAL
Qty: 30 | Refills: 0
Start: 2020-09-19 | End: 2020-10-18

## 2020-09-22 ENCOUNTER — APPOINTMENT (OUTPATIENT)
Dept: CARDIOTHORACIC SURGERY | Facility: CLINIC | Age: 85
End: 2020-09-22
Payer: MEDICARE

## 2020-09-22 VITALS
OXYGEN SATURATION: 98 % | BODY MASS INDEX: 26.99 KG/M2 | SYSTOLIC BLOOD PRESSURE: 167 MMHG | RESPIRATION RATE: 14 BRPM | HEIGHT: 65 IN | TEMPERATURE: 97.8 F | WEIGHT: 162 LBS | HEART RATE: 57 BPM | DIASTOLIC BLOOD PRESSURE: 69 MMHG

## 2020-09-22 PROBLEM — I35.0 NONRHEUMATIC AORTIC (VALVE) STENOSIS: Chronic | Status: ACTIVE | Noted: 2020-09-14

## 2020-09-22 PROCEDURE — 99214 OFFICE O/P EST MOD 30 MIN: CPT

## 2020-09-30 ENCOUNTER — APPOINTMENT (OUTPATIENT)
Dept: ELECTROPHYSIOLOGY | Facility: CLINIC | Age: 85
End: 2020-09-30
Payer: MEDICARE

## 2020-09-30 ENCOUNTER — NON-APPOINTMENT (OUTPATIENT)
Age: 85
End: 2020-09-30

## 2020-09-30 VITALS
WEIGHT: 162 LBS | DIASTOLIC BLOOD PRESSURE: 71 MMHG | BODY MASS INDEX: 26.99 KG/M2 | OXYGEN SATURATION: 100 % | HEART RATE: 49 BPM | SYSTOLIC BLOOD PRESSURE: 184 MMHG | HEIGHT: 65 IN

## 2020-09-30 PROCEDURE — 99024 POSTOP FOLLOW-UP VISIT: CPT

## 2020-09-30 PROCEDURE — 93279 PRGRMG DEV EVAL PM/LDLS PM: CPT

## 2020-10-05 ENCOUNTER — OUTPATIENT (OUTPATIENT)
Dept: OUTPATIENT SERVICES | Facility: HOSPITAL | Age: 85
LOS: 1 days | End: 2020-10-05
Payer: MEDICARE

## 2020-10-05 ENCOUNTER — APPOINTMENT (OUTPATIENT)
Dept: CT IMAGING | Facility: HOSPITAL | Age: 85
End: 2020-10-05
Payer: MEDICARE

## 2020-10-05 DIAGNOSIS — Z00.8 ENCOUNTER FOR OTHER GENERAL EXAMINATION: ICD-10-CM

## 2020-10-05 DIAGNOSIS — C34.31 MALIGNANT NEOPLASM OF LOWER LOBE, RIGHT BRONCHUS OR LUNG: ICD-10-CM

## 2020-10-05 DIAGNOSIS — Z98.89 OTHER SPECIFIED POSTPROCEDURAL STATES: Chronic | ICD-10-CM

## 2020-10-05 PROCEDURE — 71250 CT THORAX DX C-: CPT

## 2020-10-05 PROCEDURE — 71250 CT THORAX DX C-: CPT | Mod: 26

## 2020-10-06 ENCOUNTER — APPOINTMENT (OUTPATIENT)
Dept: CT IMAGING | Facility: HOSPITAL | Age: 85
End: 2020-10-06

## 2020-10-12 ENCOUNTER — NON-APPOINTMENT (OUTPATIENT)
Age: 85
End: 2020-10-12

## 2020-10-12 ENCOUNTER — APPOINTMENT (OUTPATIENT)
Dept: CARDIOLOGY | Facility: CLINIC | Age: 85
End: 2020-10-12
Payer: MEDICARE

## 2020-10-12 ENCOUNTER — APPOINTMENT (OUTPATIENT)
Dept: THORACIC SURGERY | Facility: CLINIC | Age: 85
End: 2020-10-12
Payer: MEDICARE

## 2020-10-12 VITALS
SYSTOLIC BLOOD PRESSURE: 194 MMHG | HEART RATE: 61 BPM | RESPIRATION RATE: 17 BRPM | OXYGEN SATURATION: 99 % | DIASTOLIC BLOOD PRESSURE: 83 MMHG

## 2020-10-12 VITALS
BODY MASS INDEX: 26.16 KG/M2 | WEIGHT: 157 LBS | DIASTOLIC BLOOD PRESSURE: 71 MMHG | TEMPERATURE: 97.4 F | HEIGHT: 65 IN | OXYGEN SATURATION: 100 % | HEART RATE: 59 BPM | SYSTOLIC BLOOD PRESSURE: 175 MMHG | RESPIRATION RATE: 17 BRPM

## 2020-10-12 PROCEDURE — 99214 OFFICE O/P EST MOD 30 MIN: CPT

## 2020-10-12 PROCEDURE — 99215 OFFICE O/P EST HI 40 MIN: CPT

## 2020-10-12 PROCEDURE — 93000 ELECTROCARDIOGRAM COMPLETE: CPT

## 2020-10-15 NOTE — DATA REVIEWED
[FreeTextEntry1] : CT chest on 10/05/2020:\par - Patient is status post wedge resections in both lungs. No pulmonary nodules are noted. No pleural effusions are noted. \par - Patient is status post TAVR. A Micro pacemaker is noted in place.

## 2020-10-15 NOTE — CONSULT LETTER
[Dear  ___] : Dear  [unfilled], [Consult Letter:] : I had the pleasure of evaluating your patient, [unfilled]. [( Thank you for referring [unfilled] for consultation for _____ )] : Thank you for referring [unfilled] for consultation for [unfilled] [Please see my note below.] : Please see my note below. [Consult Closing:] : Thank you very much for allowing me to participate in the care of this patient.  If you have any questions, please do not hesitate to contact me. [Sincerely,] : Sincerely, [FreeTextEntry2] : Dr. Liliana Hamilton (PCP)\par Dr. Brayden Morales (Cardio)\par Dr. Kedar Duran (Pulm) [FreeTextEntry3] : Casimiro Rai MD \par Attending Surgeon \par Division of Thoracic Surgery \par , Cardiovascular and Thoracic Surgery \par [facility] [employer] [facility]\par \par

## 2020-10-15 NOTE — HISTORY OF PRESENT ILLNESS
[FreeTextEntry1] : Mr. Lindsey is an 88 year old male who presents today for one year follow up. He is s/p Left VATS, LLL wedge resection on 7/9/2015 for Stage IA (pT1b pN0 pMx) adenocarcinoma, and s/p Right VATS, RLL wedge resection on 1/30/2014 for Stage IA (pT1a pN0 pMx) adenocarcinoma.\par \par CT Chest on 9/30/19 reveals:\par - Stable bilateral lower lobe postoperative, stable scarring noted laterally in lingula\par - No new nodules or lymphadenopathy\par - Stable scattered small cysts and subcentimeter hypodensities noted in liver\par \par CT chest on 10/05/2020:\par - Patient is status post wedge resections in both lungs. No pulmonary nodules are noted. No pleural effusions are noted. \par - Patient is status post TAVR. A Micro pacemaker is noted in place. \par \par Of note, patient is s/p TAVR and insertion of a Micra pacemaker on 09/16/2020 by Dr. Braden Sadler (CT surgery). \par \par Patient is here today for a follow up. Patient denies shortness of breath, cough, chest pain, fever, chills, loss of appetite, weight loss, or hemoptysis.

## 2020-10-15 NOTE — ASSESSMENT
[FreeTextEntry1] : Mr. Lindsey is an 88 year old male who presents today for one year follow up. He is s/p Left VATS, LLL wedge resection on 7/9/2015 for Stage IA (pT1b pN0 pMx) adenocarcinoma, and s/p Right VATS, RLL wedge resection on 1/30/2014 for Stage IA (pT1a pN0 pMx) adenocarcinoma.\par \par CT Chest on 9/30/19 reveals:\par - Stable bilateral lower lobe postoperative, stable scarring noted laterally in lingula\par - No new nodules or lymphadenopathy\par - Stable scattered small cysts and subcentimeter hypodensities noted in liver\par \par CT chest on 10/05/2020:\par - Patient is status post wedge resections in both lungs. No pulmonary nodules are noted. No pleural effusions are noted. \par - Patient is status post TAVR. A Micro pacemaker is noted in place. \par \par Of note, patient is s/p TAVR and insertion of a Micra pacemaker on 09/16/2020 by Dr. Braden Sadler (CT surgery). \par \par I have reviewed the patient's medical records and diagnostic images at time of this office consultation and have made the following recommendation:\par 1. CT chest reviewed and explained to patient, I recommended patient to return to office in 1 year with CT Chest without contrast.\par 2. Elevated BP, 194/83 L, 200/88 R, patient denied HA, dizziness, chest pain. Instructed patient to F/u with Dr. Brayden Morales (Card). Cardiologist office was contacted by patient, and awaiting for F/u. \par \par I personally performed the services described in the documentation, reviewed the documentation recorded by the scribe in my presence and it accurately and completely records my words and actions.\par \par I, Jacque Mitchell NP, am scribing for and the presence of DAKOTA Hansen, the following sections HISTORY OF PRESENT ILLNESS, PAST MEDICAL/FAMILY/SOCIAL HISTORY; REVIEW OF SYSTEMS; VITAL SIGNS; PHYSICAL EXAM; DISPOSITION.

## 2020-10-15 NOTE — PHYSICAL EXAM
[Sclera] : the sclera and conjunctiva were normal [PERRL With Normal Accommodation] : pupils were equal in size, round, and reactive to light [Extraocular Movements] : extraocular movements were intact [Neck Appearance] : the appearance of the neck was normal [Neck Cervical Mass (___cm)] : no neck mass was observed [Jugular Venous Distention Increased] : there was no jugular-venous distention [Thyroid Diffuse Enlargement] : the thyroid was not enlarged [Thyroid Nodule] : there were no palpable thyroid nodules [Auscultation Breath Sounds / Voice Sounds] : lungs were clear to auscultation bilaterally [Heart Rate And Rhythm] : heart rate was normal and rhythm regular [Heart Sounds] : normal S1 and S2 [Heart Sounds Gallop] : no gallops [Murmurs] : no murmurs [Heart Sounds Pericardial Friction Rub] : no pericardial rub [Examination Of The Chest] : the chest was normal in appearance [Chest Visual Inspection Thoracic Asymmetry] : no chest asymmetry [Diminished Respiratory Excursion] : normal chest expansion [2+] : left 2+ [No Abnormalities] : the abdominal aorta was not enlarged and no bruit was heard [Breast Appearance] : normal in appearance [Breast Palpation Mass] : no palpable masses [Bowel Sounds] : normal bowel sounds [Abdomen Soft] : soft [Abdomen Tenderness] : non-tender [Abdomen Mass (___ Cm)] : no abdominal mass palpated [Cervical Lymph Nodes Enlarged Posterior Bilaterally] : posterior cervical [Cervical Lymph Nodes Enlarged Anterior Bilaterally] : anterior cervical [Supraclavicular Lymph Nodes Enlarged Bilaterally] : supraclavicular [No CVA Tenderness] : no ~M costovertebral angle tenderness [No Spinal Tenderness] : no spinal tenderness [Abnormal Walk] : normal gait [Nail Clubbing] : no clubbing  or cyanosis of the fingernails [Musculoskeletal - Swelling] : no joint swelling seen [Motor Tone] : muscle strength and tone were normal [Skin Color & Pigmentation] : normal skin color and pigmentation [Skin Turgor] : normal skin turgor [] : no rash [Deep Tendon Reflexes (DTR)] : deep tendon reflexes were 2+ and symmetric [Sensation] : the sensory exam was normal to light touch and pinprick [No Focal Deficits] : no focal deficits [Oriented To Time, Place, And Person] : oriented to person, place, and time [Impaired Insight] : insight and judgment were intact [Affect] : the affect was normal [Right Carotid Bruit] : no bruit heard over the right carotid [Left Carotid Bruit] : no bruit heard over the left carotid [Right Femoral Bruit] : no bruit heard over the right femoral artery [Left Femoral Bruit] : no bruit heard over the left femoral artery [FreeTextEntry1] : Deferred

## 2020-10-20 ENCOUNTER — NON-APPOINTMENT (OUTPATIENT)
Age: 85
End: 2020-10-20

## 2020-10-20 ENCOUNTER — APPOINTMENT (OUTPATIENT)
Dept: CARDIOLOGY | Facility: CLINIC | Age: 85
End: 2020-10-20
Payer: MEDICARE

## 2020-10-20 VITALS
BODY MASS INDEX: 25.83 KG/M2 | HEART RATE: 57 BPM | SYSTOLIC BLOOD PRESSURE: 147 MMHG | TEMPERATURE: 97.2 F | RESPIRATION RATE: 17 BRPM | OXYGEN SATURATION: 100 % | HEIGHT: 65 IN | WEIGHT: 155 LBS | DIASTOLIC BLOOD PRESSURE: 65 MMHG

## 2020-10-20 DIAGNOSIS — I44.2 ATRIOVENTRICULAR BLOCK, COMPLETE: ICD-10-CM

## 2020-10-20 PROCEDURE — 93000 ELECTROCARDIOGRAM COMPLETE: CPT

## 2020-10-20 PROCEDURE — 99213 OFFICE O/P EST LOW 20 MIN: CPT

## 2020-10-22 ENCOUNTER — OUTPATIENT (OUTPATIENT)
Dept: OUTPATIENT SERVICES | Facility: HOSPITAL | Age: 85
LOS: 1 days | End: 2020-10-22
Payer: MEDICARE

## 2020-10-22 ENCOUNTER — APPOINTMENT (OUTPATIENT)
Dept: CARDIOTHORACIC SURGERY | Facility: CLINIC | Age: 85
End: 2020-10-22
Payer: MEDICARE

## 2020-10-22 ENCOUNTER — NON-APPOINTMENT (OUTPATIENT)
Age: 85
End: 2020-10-22

## 2020-10-22 VITALS
HEIGHT: 68 IN | SYSTOLIC BLOOD PRESSURE: 150 MMHG | OXYGEN SATURATION: 98 % | DIASTOLIC BLOOD PRESSURE: 70 MMHG | HEART RATE: 54 BPM | RESPIRATION RATE: 16 BRPM

## 2020-10-22 DIAGNOSIS — I25.10 ATHEROSCLEROTIC HEART DISEASE OF NATIVE CORONARY ARTERY WITHOUT ANGINA PECTORIS: ICD-10-CM

## 2020-10-22 DIAGNOSIS — Z98.89 OTHER SPECIFIED POSTPROCEDURAL STATES: Chronic | ICD-10-CM

## 2020-10-22 DIAGNOSIS — I35.0 NONRHEUMATIC AORTIC (VALVE) STENOSIS: ICD-10-CM

## 2020-10-22 PROCEDURE — 93306 TTE W/DOPPLER COMPLETE: CPT | Mod: 26

## 2020-10-22 PROCEDURE — 93306 TTE W/DOPPLER COMPLETE: CPT

## 2020-10-22 PROCEDURE — 93000 ELECTROCARDIOGRAM COMPLETE: CPT

## 2020-10-22 PROCEDURE — 99215 OFFICE O/P EST HI 40 MIN: CPT

## 2020-10-22 NOTE — PHYSICAL EXAM
[Sclera] : the sclera and conjunctiva were normal [PERRL With Normal Accommodation] : pupils were equal in size, round, and reactive to light [Extraocular Movements] : extraocular movements were intact [Neck Appearance] : the appearance of the neck was normal [Jugular Venous Distention Increased] : there was no jugular-venous distention [Respiration, Rhythm And Depth] : normal respiratory rhythm and effort [Exaggerated Use Of Accessory Muscles For Inspiration] : no accessory muscle use [Auscultation Breath Sounds / Voice Sounds] : lungs were clear to auscultation bilaterally [Apical Impulse] : the apical impulse was normal [Heart Rate And Rhythm] : heart rate was normal and rhythm regular [Heart Sounds] : normal S1 and S2 [Heart Sounds Gallop] : no gallops [Murmurs] : no murmurs [Arterial Pulses Carotid] : carotid pulses were normal with no bruits [Abdominal Aorta] : the abdominal aorta was normal [Arterial Pulses Femoral] : femoral pulses were normal without bruits [Full Pulse] : the pedal pulses are present [Edema] : there was no peripheral edema [Bowel Sounds] : normal bowel sounds [Abdomen Soft] : soft [Abdomen Tenderness] : non-tender [Abnormal Walk] : normal gait [Nail Clubbing] : no clubbing  or cyanosis of the fingernails [Musculoskeletal - Swelling] : no joint swelling seen [Skin Color & Pigmentation] : normal skin color and pigmentation [Skin Turgor] : normal skin turgor [] : no rash [Skin Lesions] : no skin lesions [Sensation] : the sensory exam was normal to light touch and pinprick [Motor Exam] : the motor exam was normal [No Focal Deficits] : no focal deficits [Oriented To Time, Place, And Person] : oriented to person, place, and time [Impaired Insight] : insight and judgment were intact [Affect] : the affect was normal [Mood] : the mood was normal

## 2020-10-22 NOTE — REVIEW OF SYSTEMS
[As noted in HPI] : as noted in HPI [Joint Pain] : joint pain [Negative] : Psychiatric [Heart Rate Is Slow] : the heart rate was not slow [Heart Rate Is Fast] : the heart rate was not fast [Chest Pain] : no chest pain [Palpitations] : no palpitations [Leg Claudication] : no intermittent leg claudication [Lower Ext Edema] : no extremity edema [Shortness Of Breath] : no shortness of breath [Cough] : no cough [SOB on Exertion] : no shortness of breath during exertion [FreeTextEntry9] : B/L foot pain

## 2020-10-22 NOTE — ASSESSMENT
[FreeTextEntry1] : Mr. Lindsey is recovering well from his TAVR. He is walking somewhat, but I explained to him and his wife the importance of exercising more, and he stated he will try. No medication changes were made.I answered all questions he and his wife had. He will continue to follow up with Dr. Morales, and will see us again in one year. I did explain that if there were any concerns, I will see him sooner.

## 2020-10-22 NOTE — HISTORY OF PRESENT ILLNESS
[Dyslipidemia] : Dyslipidemia [Hypertension] : Hypertension [Class I] : Class I [Arrhythmia] : Arrhythmia [3rd Degree Heart Block] : Third Degree Heart Block [FreeTextEntry1] : DELPHINE PEDERSEN 88 year year old M, presenting today for his  30-day follow up after his TAVR/MitraClip procedure on 9/16/20 with a 26 mm CoreValve via TF route. His post op course was significant for a PPM placement, and then was discharged on POD #2.\par \par Today he presents with his wife, stating he feels ok. He is walking somewhat, but mostly around the house or to his car. He denies any CP, SOB or dizziness, which is a change than from before his procedure. His appetite has gotten better, and he denies any issues with sleeping or with pedal edema.\par \par 5 meter Gait:    5.5/ 5.5/ 5.3       Seconds\par Current NYHA Class: I\par  [V tach / V fib] :  but no V tach/V fib [2nd Degree Heart Block] : no second degree heart block [Sick Sinus Syndrome] : no sick sinus syndrome [A fib / A flutter] : no A fib or A flutter

## 2020-11-05 LAB
ANION GAP SERPL CALC-SCNC: 13 MMOL/L
BASOPHILS # BLD AUTO: 0.04 K/UL
BASOPHILS NFR BLD AUTO: 0.7 %
BUN SERPL-MCNC: 40 MG/DL
CALCIUM SERPL-MCNC: 10 MG/DL
CHLORIDE SERPL-SCNC: 106 MMOL/L
CO2 SERPL-SCNC: 19 MMOL/L
CREAT SERPL-MCNC: 1.84 MG/DL
EOSINOPHIL # BLD AUTO: 0.48 K/UL
EOSINOPHIL NFR BLD AUTO: 8.6 %
GLUCOSE SERPL-MCNC: 115 MG/DL
HCT VFR BLD CALC: 34.1 %
HGB BLD-MCNC: 10.8 G/DL
IMM GRANULOCYTES NFR BLD AUTO: 0.4 %
LYMPHOCYTES # BLD AUTO: 1.25 K/UL
LYMPHOCYTES NFR BLD AUTO: 22.3 %
MAN DIFF?: NORMAL
MCHC RBC-ENTMCNC: 29.9 PG
MCHC RBC-ENTMCNC: 31.7 GM/DL
MCV RBC AUTO: 94.5 FL
MONOCYTES # BLD AUTO: 0.61 K/UL
MONOCYTES NFR BLD AUTO: 10.9 %
NEUTROPHILS # BLD AUTO: 3.21 K/UL
NEUTROPHILS NFR BLD AUTO: 57.1 %
PLATELET # BLD AUTO: 215 K/UL
POTASSIUM SERPL-SCNC: 4.5 MMOL/L
RBC # BLD: 3.61 M/UL
RBC # FLD: 19.6 %
SODIUM SERPL-SCNC: 138 MMOL/L
WBC # FLD AUTO: 5.61 K/UL

## 2020-12-30 ENCOUNTER — APPOINTMENT (OUTPATIENT)
Dept: ELECTROPHYSIOLOGY | Facility: CLINIC | Age: 85
End: 2020-12-30

## 2021-03-02 ENCOUNTER — APPOINTMENT (OUTPATIENT)
Dept: CARDIOLOGY | Facility: CLINIC | Age: 86
End: 2021-03-02
Payer: MEDICARE

## 2021-03-02 ENCOUNTER — NON-APPOINTMENT (OUTPATIENT)
Age: 86
End: 2021-03-02

## 2021-03-02 VITALS
SYSTOLIC BLOOD PRESSURE: 160 MMHG | RESPIRATION RATE: 18 BRPM | BODY MASS INDEX: 23.11 KG/M2 | WEIGHT: 156 LBS | HEART RATE: 98 BPM | TEMPERATURE: 96.9 F | HEIGHT: 69 IN | OXYGEN SATURATION: 98 % | DIASTOLIC BLOOD PRESSURE: 76 MMHG

## 2021-03-02 PROCEDURE — 93000 ELECTROCARDIOGRAM COMPLETE: CPT

## 2021-03-02 PROCEDURE — 99213 OFFICE O/P EST LOW 20 MIN: CPT

## 2021-03-17 ENCOUNTER — APPOINTMENT (OUTPATIENT)
Dept: CARDIOLOGY | Facility: CLINIC | Age: 86
End: 2021-03-17
Payer: MEDICARE

## 2021-03-17 PROCEDURE — 93288 INTERROG EVL PM/LDLS PM IP: CPT

## 2021-03-17 NOTE — PROCEDURE
[Pacemaker] : pacemaker [Voltage: ___ volts] : Voltage was [unfilled] volts [Longevity: ___ months] : The estimated remaining battery life is [unfilled] months [Lead Imp:  ___ohms] : lead impedance was [unfilled] ohms [___V @] : [unfilled] V [___ ms] : [unfilled] ms [Sense ___ %] : Sense [unfilled]% [Pace ___ %] : Pace [unfilled]% [de-identified] : [employer] [de-identified] : ELLIOT WILKINSON [id number] [de-identified] : [id number] [de-identified] : 9/16/2020 [de-identified] : SHIVANI [de-identified] :  [de-identified] : NORMAL PACER FUNCTION

## 2021-04-16 ENCOUNTER — APPOINTMENT (OUTPATIENT)
Dept: PULMONOLOGY | Facility: CLINIC | Age: 86
End: 2021-04-16
Payer: MEDICARE

## 2021-04-16 VITALS
OXYGEN SATURATION: 97 % | WEIGHT: 156 LBS | HEIGHT: 69 IN | TEMPERATURE: 97.5 F | HEART RATE: 60 BPM | BODY MASS INDEX: 23.11 KG/M2 | SYSTOLIC BLOOD PRESSURE: 140 MMHG | DIASTOLIC BLOOD PRESSURE: 72 MMHG

## 2021-04-16 DIAGNOSIS — M54.9 DORSALGIA, UNSPECIFIED: ICD-10-CM

## 2021-04-16 DIAGNOSIS — R06.02 SHORTNESS OF BREATH: ICD-10-CM

## 2021-04-16 DIAGNOSIS — M54.32 SCIATICA, LEFT SIDE: ICD-10-CM

## 2021-04-16 PROCEDURE — 99213 OFFICE O/P EST LOW 20 MIN: CPT

## 2021-04-16 RX ORDER — FLUTICASONE FUROATE, UMECLIDINIUM BROMIDE AND VILANTEROL TRIFENATATE 100; 62.5; 25 UG/1; UG/1; UG/1
100-62.5-25 POWDER RESPIRATORY (INHALATION) DAILY
Qty: 1 | Refills: 6 | Status: ACTIVE | COMMUNITY
Start: 2021-04-16 | End: 1900-01-01

## 2021-04-16 NOTE — REVIEW OF SYSTEMS
[Chronic Pain] : chronic pain [Negative] : Endocrine [TextBox_30] : Remittent dyspnea [TextBox_91] : Back to left lower leg described as intermittent shooting pain

## 2021-04-27 ENCOUNTER — OUTPATIENT (OUTPATIENT)
Dept: OUTPATIENT SERVICES | Facility: HOSPITAL | Age: 86
LOS: 1 days | End: 2021-04-27
Payer: MEDICARE

## 2021-04-27 ENCOUNTER — APPOINTMENT (OUTPATIENT)
Dept: CT IMAGING | Facility: HOSPITAL | Age: 86
End: 2021-04-27
Payer: MEDICARE

## 2021-04-27 DIAGNOSIS — Z98.89 OTHER SPECIFIED POSTPROCEDURAL STATES: Chronic | ICD-10-CM

## 2021-04-27 DIAGNOSIS — M54.32 SCIATICA, LEFT SIDE: ICD-10-CM

## 2021-04-27 PROCEDURE — G1004: CPT

## 2021-04-27 PROCEDURE — 72131 CT LUMBAR SPINE W/O DYE: CPT | Mod: 26,ME

## 2021-04-27 PROCEDURE — 72131 CT LUMBAR SPINE W/O DYE: CPT

## 2021-05-07 ENCOUNTER — APPOINTMENT (OUTPATIENT)
Dept: ORTHOPEDIC SURGERY | Facility: CLINIC | Age: 86
End: 2021-05-07
Payer: MEDICARE

## 2021-05-07 VITALS
SYSTOLIC BLOOD PRESSURE: 144 MMHG | BODY MASS INDEX: 23.34 KG/M2 | WEIGHT: 154 LBS | HEART RATE: 86 BPM | DIASTOLIC BLOOD PRESSURE: 78 MMHG | HEIGHT: 68 IN

## 2021-05-07 DIAGNOSIS — M53.2X7 SPINAL INSTABILITIES, LUMBOSACRAL REGION: ICD-10-CM

## 2021-05-07 PROCEDURE — 72100 X-RAY EXAM L-S SPINE 2/3 VWS: CPT

## 2021-05-07 PROCEDURE — 99204 OFFICE O/P NEW MOD 45 MIN: CPT

## 2021-05-07 NOTE — PHYSICAL EXAM
[de-identified] : Constitutional\par o Appearance : well-nourished, well developed, alert, in no acute distress \par Head and Face\par o Head :\par ¦ Inspection : atraumatic, normocephalic\par o Face :\par ¦ Inspection : no visible rash or discoloration\par Respiratory\par o Respiratory Effort: breathing unlabored \par Neurologic\par o Mental Status Examination :\par ¦ Orientation : alert and oriented X 3\par Psychiatric\par o Mood and Affect: mood normal, affect appropriate\par Cardiovascular\par o Observation/Palpation : - no swelling\par Lymphatic\par o Additional Nodes : No palpable lymph nodes present\par \par Lumbosacral Spine\par o Inspection : no visible rash or discoloration\par o Palpation : no paraspinal musculature tenderness\par o Range of Motion : extension and sidebending cause back discomfort, rotation causes back discomfort right side worse than left\par o Muscle Strength : paraspinal muscle strength and tone within normal limits\par o Muscle Tone : paraspinal muscle strength and tone within normal limits\par o Tests: straight leg test negative bilaterally, SHASHA test positive bilaterally\par  \par Right Lower Extremity\par o Buttock : no tenderness, swelling or deformities\par o Right Hip :\par ¦ Inspection/Palpation : no tenderness, no swelling or deformities\par ¦ Range of Motion : pain with flexion and rotation, no crepitance\par ¦ Stability : joint stability intact\par ¦ Strength : extension, flexion, adduction, abduction, internal rotation and external rotation, 5/5\par ¦ Tests: Jamila’s test negative\par \par Left Lower Extremity\par o Buttock : no tenderness, swelling or deformities \par o Left Hip :\par ¦ Inspection/Palpation : no tenderness, no swelling or deformities\par ¦ Range of Motion : pain with flexion and rotation, no crepitance\par ¦ Stability : joint stability intact\par ¦ Strength : extension, flexion, adduction, abduction, internal rotation and external rotation, 5/5\par ¦ Tests: Jamila’s test negative\par \par Gait: very short choppy steps, kyphotic gait, no significant extremity swelling or lymphedema, good proprioception and balance, normal sensation to light touch, no foot drop\par \par Radiology Results\par o Lumbosacral Spine : AP, lateral, flexion and extension views were obtained and revealed a fusion at L2/L3 and L3/L4 with severe degenerative disc disease at L5/S1 and foraminal stenosis at L4/L5 and L5/S1, severe narrowing of the distal aorta, grade 1 spondylolisthesis of L5 on S1, flexion and extension views show mild instability at L5/S1.

## 2021-05-07 NOTE — ADDENDUM
[FreeTextEntry1] : I, Jace Eldridge, acted solely as a scribe for Dr. Pierre Payne on this date 05/07/2021.\par All medical record entries made by the Scribe were at my, Dr. Pierre Payne, direction and personally dictated by me on 05/07/2021. I have reviewed the chart and agree that the record accurately reflects my personal performance of the history, physical exam, assessment and plan. I have also personally directed, reviewed, and agreed with the chart.

## 2021-05-07 NOTE — DISCUSSION/SUMMARY
[de-identified] : I discussed the underlying pathophysiology of the patient's condition in great detail with the patient. I went over the patient's x-rays and CT scan with them in great detail. The extent of the patient’s arthritis and stenosis was discussed in great detail with them. We discussed the use of ice, Tylenol and anti-inflammatories to relieve pain. At-home strengthening and stretching exercises were discussed and demonstrated with the patient. I recommend that the patient should walk with a cane to take pressure off of his back and prevent a fall. A prescription for a cane was provided. At this time, he will start a course of physical therapy for strengthening and flexibility. A prescription for physical therapy was provided. All of his questions were answered. He understands and consents to the plan. This treatment plan was discussed and reviewed with the patient's spouse who agrees with the treatment course. \par \par FU 1 month.\par after undergoing PT for his low back.\par after using a cane.
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
## 2021-05-18 ENCOUNTER — APPOINTMENT (OUTPATIENT)
Dept: PULMONOLOGY | Facility: CLINIC | Age: 86
End: 2021-05-18

## 2021-06-11 ENCOUNTER — APPOINTMENT (OUTPATIENT)
Dept: ORTHOPEDIC SURGERY | Facility: CLINIC | Age: 86
End: 2021-06-11
Payer: MEDICARE

## 2021-06-11 DIAGNOSIS — M16.0 BILATERAL PRIMARY OSTEOARTHRITIS OF HIP: ICD-10-CM

## 2021-06-11 DIAGNOSIS — M47.816 SPONDYLOSIS W/OUT MYELOPATHY OR RADICULOPATHY, LUMBAR REGION: ICD-10-CM

## 2021-06-11 DIAGNOSIS — M48.062 SPINAL STENOSIS, LUMBAR REGION WITH NEUROGENIC CLAUDICATION: ICD-10-CM

## 2021-06-11 PROCEDURE — 99214 OFFICE O/P EST MOD 30 MIN: CPT

## 2021-06-11 PROCEDURE — 73522 X-RAY EXAM HIPS BI 3-4 VIEWS: CPT

## 2021-06-11 NOTE — ADDENDUM
[FreeTextEntry1] : I, Jace Eldridge, acted solely as a scribe for Dr. Pierre Payne on this date 06/11/2021.\par All medical record entries made by the Scribe were at my, Dr. Pierre Payne, direction and personally dictated by me on 06/11/2021. I have reviewed the chart and agree that the record accurately reflects my personal performance of the history, physical exam, assessment and plan. I have also personally directed, reviewed, and agreed with the chart.

## 2021-06-11 NOTE — PHYSICAL EXAM
[de-identified] : Constitutional\par o Appearance : well-nourished, well developed, alert, in no acute distress \par Head and Face\par o Head :\par ¦ Inspection : atraumatic, normocephalic\par o Face :\par ¦ Inspection : no visible rash or discoloration\par Respiratory\par o Respiratory Effort: breathing unlabored \par Neurologic\par o Mental Status Examination :\par ¦ Orientation : alert and oriented X 3\par Psychiatric\par o Mood and Affect: mood normal, affect appropriate\par Cardiovascular\par o Observation/Palpation : - no swelling\par Lymphatic\par o Additional Nodes : No palpable lymph nodes present\par \par Lumbosacral Spine\par o Inspection : no visible rash or discoloration\par o Palpation : no paraspinal musculature tenderness, left but no right sciatic notch and SI joint tenderness\par o Range of Motion : extension causes discomfort, forward flexion relieves his symptoms, sidebending to the right causes left lower lumbar discomfort, sidebending to the left is painful, rotation to the right worse than left causes back discomfort\par o Muscle Strength : paraspinal muscle strength and tone within normal limits\par o Muscle Tone : paraspinal muscle strength and tone within normal limits\par o Tests: straight leg test negative bilaterally, SHASHA test positive bilaterally\par  \par Right Lower Extremity\par o Buttock : no tenderness, swelling or deformities\par o Right Hip :\par ¦ Inspection/Palpation : no tenderness, no swelling or deformities\par ¦ Range of Motion : FROM, no crepitance\par ¦ Stability : joint stability intact\par ¦ Strength : extension, flexion 4+/5, adduction 4+/5, abduction 4+/5, internal rotation and external rotation\par ¦ Tests: Jamila’s test negative\par \par Left Lower Extremity\par o Buttock : no tenderness, swelling or deformities \par o Left Hip :\par ¦ Inspection/Palpation : no tenderness, no swelling or deformities\par ¦ Range of Motion : full flexion and rotation, rotation causes back pain, no crepitance\par ¦ Stability : joint stability intact\par ¦ Strength : extension, flexion 4/5, adduction 4+/5, abduction 4+/5, internal rotation and external rotation\par ¦ Tests: Jamila’s test negative\par \par Gait: very short choppy steps, kyphotic gait, no significant extremity swelling or lymphedema, good proprioception and balance, normal sensation to light touch, no foot drop, tight hamstrings bilaterally\par \par Radiology Results\par o Hip and Pelvis: AP pelvis and lateral both hips are obtained and reveal 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

## 2021-06-11 NOTE — DISCUSSION/SUMMARY
[de-identified] : I discussed the underlying pathophysiology of the patient's condition in great detail with the patient. I went over the patient's x-rays with them in great detail. We discussed the use of ice, Tylenol and anti-inflammatories to relieve pain. I am referring the patient to a pain management specialist in order to provide him with longer term relief. I informed the patient that EARLINE or SNRB injections will be required to provide them longer term relief from their symptoms. We had a lengthy discussion about the patient's issues, and talked about the benefits and risks of the injections. A referral was provided. All of his questions were answered. He understands and consents to the plan. This treatment plan was discussed and reviewed with the patient's spouse who agrees with the treatment course. \par \par FU 2 weeks after seeing pain management.
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
## 2021-07-30 ENCOUNTER — APPOINTMENT (OUTPATIENT)
Dept: CARDIOLOGY | Facility: CLINIC | Age: 86
End: 2021-07-30
Payer: MEDICARE

## 2021-07-30 ENCOUNTER — NON-APPOINTMENT (OUTPATIENT)
Age: 86
End: 2021-07-30

## 2021-07-30 VITALS
SYSTOLIC BLOOD PRESSURE: 193 MMHG | HEIGHT: 68 IN | BODY MASS INDEX: 23.04 KG/M2 | DIASTOLIC BLOOD PRESSURE: 72 MMHG | RESPIRATION RATE: 18 BRPM | OXYGEN SATURATION: 99 % | HEART RATE: 54 BPM | TEMPERATURE: 97.1 F | WEIGHT: 152 LBS

## 2021-07-30 VITALS
DIASTOLIC BLOOD PRESSURE: 70 MMHG | HEIGHT: 68 IN | HEART RATE: 54 BPM | RESPIRATION RATE: 18 BRPM | WEIGHT: 152 LBS | BODY MASS INDEX: 23.04 KG/M2 | SYSTOLIC BLOOD PRESSURE: 180 MMHG

## 2021-07-30 PROCEDURE — 99215 OFFICE O/P EST HI 40 MIN: CPT

## 2021-07-30 PROCEDURE — 93000 ELECTROCARDIOGRAM COMPLETE: CPT

## 2021-07-30 RX ORDER — HYDRALAZINE HYDROCHLORIDE 10 MG/1
10 TABLET ORAL
Qty: 60 | Refills: 1 | Status: ACTIVE | COMMUNITY
Start: 2021-07-30 | End: 1900-01-01

## 2021-08-19 ENCOUNTER — NON-APPOINTMENT (OUTPATIENT)
Age: 86
End: 2021-08-19

## 2021-08-19 ENCOUNTER — APPOINTMENT (OUTPATIENT)
Dept: ELECTROPHYSIOLOGY | Facility: CLINIC | Age: 86
End: 2021-08-19
Payer: MEDICARE

## 2021-08-19 PROCEDURE — 93296 REM INTERROG EVL PM/IDS: CPT

## 2021-08-19 PROCEDURE — 93294 REM INTERROG EVL PM/LDLS PM: CPT

## 2021-08-30 ENCOUNTER — APPOINTMENT (OUTPATIENT)
Dept: CARDIOLOGY | Facility: CLINIC | Age: 86
End: 2021-08-30
Payer: MEDICARE

## 2021-08-30 ENCOUNTER — NON-APPOINTMENT (OUTPATIENT)
Age: 86
End: 2021-08-30

## 2021-08-30 VITALS
OXYGEN SATURATION: 98 % | DIASTOLIC BLOOD PRESSURE: 70 MMHG | SYSTOLIC BLOOD PRESSURE: 152 MMHG | HEART RATE: 54 BPM | TEMPERATURE: 97.5 F | WEIGHT: 154 LBS | RESPIRATION RATE: 16 BRPM | BODY MASS INDEX: 23.34 KG/M2 | HEIGHT: 68 IN

## 2021-08-30 PROCEDURE — 93000 ELECTROCARDIOGRAM COMPLETE: CPT

## 2021-08-30 PROCEDURE — 99213 OFFICE O/P EST LOW 20 MIN: CPT

## 2021-09-01 ENCOUNTER — APPOINTMENT (OUTPATIENT)
Dept: CARDIOLOGY | Facility: CLINIC | Age: 86
End: 2021-09-01
Payer: MEDICARE

## 2021-09-01 PROCEDURE — 93288 INTERROG EVL PM/LDLS PM IP: CPT

## 2021-09-01 NOTE — PROCEDURE
[Pacemaker] : pacemaker [Voltage: ___ volts] : Voltage was [unfilled] volts [Longevity: ___ months] : The estimated remaining battery life is [unfilled] months [Lead Imp:  ___ohms] : lead impedance was [unfilled] ohms [___V @] : [unfilled] V [___ ms] : [unfilled] ms [Sense ___ %] : Sense [unfilled]% [Pace ___ %] : Pace [unfilled]% [de-identified] : MEDTRONIC [de-identified] : ELLIOT WILKINSON [id number] [de-identified] : [id number] [de-identified] : 9/16/2020 [de-identified] : SHIVANI [de-identified] :  [de-identified] : NORMAL PACER FUNCTION

## 2021-09-29 ENCOUNTER — RESULT CHARGE (OUTPATIENT)
Age: 86
End: 2021-09-29

## 2021-09-30 ENCOUNTER — APPOINTMENT (OUTPATIENT)
Dept: ELECTROPHYSIOLOGY | Facility: CLINIC | Age: 86
End: 2021-09-30
Payer: MEDICARE

## 2021-09-30 VITALS
HEART RATE: 71 BPM | OXYGEN SATURATION: 98 % | HEIGHT: 68 IN | DIASTOLIC BLOOD PRESSURE: 63 MMHG | WEIGHT: 154 LBS | BODY MASS INDEX: 23.34 KG/M2 | SYSTOLIC BLOOD PRESSURE: 179 MMHG

## 2021-09-30 PROCEDURE — 93279 PRGRMG DEV EVAL PM/LDLS PM: CPT

## 2021-09-30 PROCEDURE — 93000 ELECTROCARDIOGRAM COMPLETE: CPT | Mod: 59

## 2021-10-06 ENCOUNTER — OUTPATIENT (OUTPATIENT)
Dept: OUTPATIENT SERVICES | Facility: HOSPITAL | Age: 86
LOS: 1 days | End: 2021-10-06
Payer: MEDICARE

## 2021-10-06 ENCOUNTER — APPOINTMENT (OUTPATIENT)
Dept: CT IMAGING | Facility: HOSPITAL | Age: 86
End: 2021-10-06
Payer: MEDICARE

## 2021-10-06 ENCOUNTER — RESULT REVIEW (OUTPATIENT)
Age: 86
End: 2021-10-06

## 2021-10-06 DIAGNOSIS — Z98.89 OTHER SPECIFIED POSTPROCEDURAL STATES: Chronic | ICD-10-CM

## 2021-10-06 DIAGNOSIS — Z00.8 ENCOUNTER FOR OTHER GENERAL EXAMINATION: ICD-10-CM

## 2021-10-06 PROCEDURE — 71250 CT THORAX DX C-: CPT | Mod: MH

## 2021-10-06 PROCEDURE — 71250 CT THORAX DX C-: CPT | Mod: 26,MH

## 2021-10-18 ENCOUNTER — APPOINTMENT (OUTPATIENT)
Dept: THORACIC SURGERY | Facility: CLINIC | Age: 86
End: 2021-10-18
Payer: MEDICARE

## 2021-10-18 PROCEDURE — 99213 OFFICE O/P EST LOW 20 MIN: CPT

## 2021-10-20 NOTE — CONSULT LETTER
[Dear  ___] : Dear  [unfilled], [Consult Letter:] : I had the pleasure of evaluating your patient, [unfilled]. [( Thank you for referring [unfilled] for consultation for _____ )] : Thank you for referring [unfilled] for consultation for [unfilled] [Please see my note below.] : Please see my note below. [Consult Closing:] : Thank you very much for allowing me to participate in the care of this patient.  If you have any questions, please do not hesitate to contact me. [Sincerely,] : Sincerely, [FreeTextEntry2] : Dr. Liliana Hamilton (PCP)\par Dr. Brayden Morales (Cardio)\par Dr. Kedar Duran (Pulm) [FreeTextEntry3] : Casimiro Rai MD \par Attending Surgeon \par Division of Thoracic Surgery \par , Cardiovascular and Thoracic Surgery \par [facility] at [facility]\par \par

## 2021-10-20 NOTE — HISTORY OF PRESENT ILLNESS
[FreeTextEntry1] : Mr. Lindsey is an 89 year old male. He is s/p Left VATS, LLL wedge resection on 7/9/2015 for Stage IA (pT1b pN0 pMx) adenocarcinoma, and s/p Right VATS, RLL wedge resection on 1/30/2014 for Stage IA (pT1a pN0 pMx) adenocarcinoma.\par \par Of note, patient is s/p TAVR and insertion of a Micra pacemaker on 09/16/2020 by Dr. Braden Sadler (CT surgery). \par \par CT chest on 10/06/2021:\par - Bilateral lower lobe wedge resections. Stable mild scarring in the lower lobes. No suspicious nodule.\par \par Patient is here today for a year follow up. Patient denies shortness of breath, cough, chest pain, fever, chills.

## 2021-10-20 NOTE — ASSESSMENT
[FreeTextEntry1] : Mr. Lindsey is an 89 year old male. He is s/p Left VATS, LLL wedge resection on 7/9/2015 for Stage IA (pT1b pN0 pMx) adenocarcinoma, and s/p Right VATS, RLL wedge resection on 1/30/2014 for Stage IA (pT1a pN0 pMx) adenocarcinoma.\par \par I have reviewed the patient's medical records and diagnostic images at time of this office consultation and have made the following recommendation:\par 1. CT chest reviewed and explained to patient, I recommended patient to return to office in 12 months with CT Chest without contrast. \par \par I personally performed the services described in the documentation, reviewed the documentation recorded by the scribe in my presence and it accurately and completely records my words and actions.\par \par I, Jacque Mitchell NP, am scribing for and the presence of DAKOTA Hansen, the following sections HISTORY OF PRESENT ILLNESS, PAST MEDICAL/FAMILY/SOCIAL HISTORY; REVIEW OF SYSTEMS; VITAL SIGNS; PHYSICAL EXAM; DISPOSITION.

## 2021-12-20 ENCOUNTER — NON-APPOINTMENT (OUTPATIENT)
Age: 86
End: 2021-12-20

## 2021-12-20 ENCOUNTER — APPOINTMENT (OUTPATIENT)
Dept: CARDIOLOGY | Facility: CLINIC | Age: 86
End: 2021-12-20
Payer: MEDICARE

## 2021-12-20 VITALS
DIASTOLIC BLOOD PRESSURE: 84 MMHG | TEMPERATURE: 97.5 F | HEART RATE: 80 BPM | BODY MASS INDEX: 22.73 KG/M2 | HEIGHT: 68 IN | OXYGEN SATURATION: 96 % | RESPIRATION RATE: 17 BRPM | SYSTOLIC BLOOD PRESSURE: 160 MMHG | WEIGHT: 150 LBS

## 2021-12-20 PROCEDURE — 99215 OFFICE O/P EST HI 40 MIN: CPT

## 2021-12-20 PROCEDURE — 93000 ELECTROCARDIOGRAM COMPLETE: CPT

## 2021-12-20 RX ORDER — LOSARTAN POTASSIUM 100 MG/1
100 TABLET, FILM COATED ORAL
Qty: 1 | Refills: 0 | Status: ACTIVE | COMMUNITY
Start: 2020-10-12 | End: 1900-01-01

## 2022-01-12 ENCOUNTER — NON-APPOINTMENT (OUTPATIENT)
Age: 87
End: 2022-01-12

## 2022-01-12 ENCOUNTER — APPOINTMENT (OUTPATIENT)
Dept: ELECTROPHYSIOLOGY | Facility: CLINIC | Age: 87
End: 2022-01-12
Payer: MEDICARE

## 2022-01-12 PROCEDURE — 93294 REM INTERROG EVL PM/LDLS PM: CPT

## 2022-01-12 PROCEDURE — 93296 REM INTERROG EVL PM/IDS: CPT

## 2022-02-07 ENCOUNTER — APPOINTMENT (OUTPATIENT)
Dept: ELECTROPHYSIOLOGY | Facility: CLINIC | Age: 87
End: 2022-02-07

## 2022-03-02 ENCOUNTER — APPOINTMENT (OUTPATIENT)
Dept: CARDIOLOGY | Facility: CLINIC | Age: 87
End: 2022-03-02
Payer: MEDICARE

## 2022-03-02 PROCEDURE — 93288 INTERROG EVL PM/LDLS PM IP: CPT

## 2022-03-02 NOTE — PROCEDURE
[Pacemaker] : pacemaker [Voltage: ___ volts] : Voltage was [unfilled] volts [Longevity: ___ months] : The estimated remaining battery life is [unfilled] months [Lead Imp:  ___ohms] : lead impedance was [unfilled] ohms [___V @] : [unfilled] V [___ ms] : [unfilled] ms [Sense ___ %] : Sense [unfilled]% [Pace ___ %] : Pace [unfilled]% [de-identified] : MEDTRONIC [de-identified] : ELLIOT WILKINSON [id number] [de-identified] : [id number] [de-identified] : 9/16/2020 [de-identified] : SHIVANI [de-identified] :  [de-identified] : NORMAL PACER FUNCTION

## 2022-03-21 ENCOUNTER — EMERGENCY (EMERGENCY)
Facility: HOSPITAL | Age: 87
LOS: 1 days | Discharge: HOME | End: 2022-03-21
Attending: INTERNAL MEDICINE | Admitting: INTERNAL MEDICINE
Payer: MEDICARE

## 2022-03-21 VITALS
DIASTOLIC BLOOD PRESSURE: 73 MMHG | WEIGHT: 132.5 LBS | OXYGEN SATURATION: 97 % | RESPIRATION RATE: 18 BRPM | HEIGHT: 68 IN | SYSTOLIC BLOOD PRESSURE: 171 MMHG | TEMPERATURE: 98 F | HEART RATE: 65 BPM

## 2022-03-21 DIAGNOSIS — Z98.89 OTHER SPECIFIED POSTPROCEDURAL STATES: Chronic | ICD-10-CM

## 2022-03-21 LAB
ALBUMIN SERPL ELPH-MCNC: 3.7 G/DL — SIGNIFICANT CHANGE UP (ref 3.3–5)
ALP SERPL-CCNC: 98 U/L — SIGNIFICANT CHANGE UP (ref 40–120)
ALT FLD-CCNC: 19 U/L — SIGNIFICANT CHANGE UP (ref 10–45)
ANION GAP SERPL CALC-SCNC: 7 MMOL/L — SIGNIFICANT CHANGE UP (ref 5–17)
AST SERPL-CCNC: 16 U/L — SIGNIFICANT CHANGE UP (ref 10–40)
BASOPHILS # BLD AUTO: 0.03 K/UL — SIGNIFICANT CHANGE UP (ref 0–0.2)
BASOPHILS NFR BLD AUTO: 0.8 % — SIGNIFICANT CHANGE UP (ref 0–2)
BILIRUB SERPL-MCNC: 0.5 MG/DL — SIGNIFICANT CHANGE UP (ref 0.2–1.2)
BUN SERPL-MCNC: 28 MG/DL — HIGH (ref 7–23)
CALCIUM SERPL-MCNC: 8.9 MG/DL — SIGNIFICANT CHANGE UP (ref 8.4–10.5)
CHLORIDE SERPL-SCNC: 102 MMOL/L — SIGNIFICANT CHANGE UP (ref 96–108)
CO2 SERPL-SCNC: 28 MMOL/L — SIGNIFICANT CHANGE UP (ref 22–31)
CREAT SERPL-MCNC: 2.03 MG/DL — HIGH (ref 0.5–1.3)
EGFR: 31 ML/MIN/1.73M2 — LOW
EOSINOPHIL # BLD AUTO: 0.12 K/UL — SIGNIFICANT CHANGE UP (ref 0–0.5)
EOSINOPHIL NFR BLD AUTO: 3.4 % — SIGNIFICANT CHANGE UP (ref 0–6)
GLUCOSE SERPL-MCNC: 122 MG/DL — HIGH (ref 70–99)
HCT VFR BLD CALC: 34.6 % — LOW (ref 39–50)
HGB BLD-MCNC: 11.9 G/DL — LOW (ref 13–17)
IMM GRANULOCYTES NFR BLD AUTO: 0.3 % — SIGNIFICANT CHANGE UP (ref 0–1.5)
LIDOCAIN IGE QN: 90 U/L — SIGNIFICANT CHANGE UP (ref 73–393)
LYMPHOCYTES # BLD AUTO: 0.95 K/UL — LOW (ref 1–3.3)
LYMPHOCYTES # BLD AUTO: 26.6 % — SIGNIFICANT CHANGE UP (ref 13–44)
MCHC RBC-ENTMCNC: 34.4 GM/DL — SIGNIFICANT CHANGE UP (ref 32–36)
MCHC RBC-ENTMCNC: 35.1 PG — HIGH (ref 27–34)
MCV RBC AUTO: 102.1 FL — HIGH (ref 80–100)
MONOCYTES # BLD AUTO: 0.46 K/UL — SIGNIFICANT CHANGE UP (ref 0–0.9)
MONOCYTES NFR BLD AUTO: 12.9 % — SIGNIFICANT CHANGE UP (ref 2–14)
NEUTROPHILS # BLD AUTO: 2 K/UL — SIGNIFICANT CHANGE UP (ref 1.8–7.4)
NEUTROPHILS NFR BLD AUTO: 56 % — SIGNIFICANT CHANGE UP (ref 43–77)
NRBC # BLD: 0 /100 WBCS — SIGNIFICANT CHANGE UP (ref 0–0)
PLATELET # BLD AUTO: 126 K/UL — LOW (ref 150–400)
POTASSIUM SERPL-MCNC: 4.7 MMOL/L — SIGNIFICANT CHANGE UP (ref 3.5–5.3)
POTASSIUM SERPL-SCNC: 4.7 MMOL/L — SIGNIFICANT CHANGE UP (ref 3.5–5.3)
PROT SERPL-MCNC: 6.8 G/DL — SIGNIFICANT CHANGE UP (ref 6–8.3)
RBC # BLD: 3.39 M/UL — LOW (ref 4.2–5.8)
RBC # FLD: 13.7 % — SIGNIFICANT CHANGE UP (ref 10.3–14.5)
SODIUM SERPL-SCNC: 137 MMOL/L — SIGNIFICANT CHANGE UP (ref 135–145)
TROPONIN I, HIGH SENSITIVITY RESULT: 19.8 NG/L — SIGNIFICANT CHANGE UP
WBC # BLD: 3.57 K/UL — LOW (ref 3.8–10.5)
WBC # FLD AUTO: 3.57 K/UL — LOW (ref 3.8–10.5)

## 2022-03-21 PROCEDURE — 93010 ELECTROCARDIOGRAM REPORT: CPT

## 2022-03-21 PROCEDURE — 99284 EMERGENCY DEPT VISIT MOD MDM: CPT

## 2022-03-21 PROCEDURE — 71250 CT THORAX DX C-: CPT | Mod: 26,MA

## 2022-03-21 PROCEDURE — 71250 CT THORAX DX C-: CPT | Mod: MA

## 2022-03-21 PROCEDURE — 74176 CT ABD & PELVIS W/O CONTRAST: CPT | Mod: 26,MA

## 2022-03-21 PROCEDURE — 85025 COMPLETE CBC W/AUTO DIFF WBC: CPT

## 2022-03-21 PROCEDURE — 99285 EMERGENCY DEPT VISIT HI MDM: CPT | Mod: 25

## 2022-03-21 PROCEDURE — 80053 COMPREHEN METABOLIC PANEL: CPT

## 2022-03-21 PROCEDURE — 83690 ASSAY OF LIPASE: CPT

## 2022-03-21 PROCEDURE — 36415 COLL VENOUS BLD VENIPUNCTURE: CPT

## 2022-03-21 PROCEDURE — 74176 CT ABD & PELVIS W/O CONTRAST: CPT | Mod: MA

## 2022-03-21 PROCEDURE — 93005 ELECTROCARDIOGRAM TRACING: CPT

## 2022-03-21 PROCEDURE — 84484 ASSAY OF TROPONIN QUANT: CPT

## 2022-03-21 RX ORDER — ACETAMINOPHEN 500 MG
975 TABLET ORAL ONCE
Refills: 0 | Status: COMPLETED | OUTPATIENT
Start: 2022-03-21 | End: 2022-03-21

## 2022-03-21 RX ORDER — DORZOLAMIDE HYDROCHLORIDE TIMOLOL MALEATE 20; 5 MG/ML; MG/ML
1 SOLUTION/ DROPS OPHTHALMIC
Qty: 0 | Refills: 0 | DISCHARGE

## 2022-03-21 RX ORDER — LEVOTHYROXINE SODIUM 125 MCG
1 TABLET ORAL
Qty: 0 | Refills: 0 | DISCHARGE

## 2022-03-21 RX ORDER — LIDOCAINE 4 G/100G
1 CREAM TOPICAL
Qty: 20 | Refills: 0
Start: 2022-03-21 | End: 2022-03-30

## 2022-03-21 RX ORDER — HYDRALAZINE HCL 50 MG
1 TABLET ORAL
Qty: 0 | Refills: 0 | DISCHARGE

## 2022-03-21 RX ADMIN — Medication 975 MILLIGRAM(S): at 13:39

## 2022-03-21 RX ADMIN — Medication 975 MILLIGRAM(S): at 12:39

## 2022-03-21 NOTE — ED PROVIDER NOTE - NSFOLLOWUPINSTRUCTIONS_ED_ALL_ED_FT
Rest, drink plenty of fluids  Advance activity as tolerated  Continue all previously prescribed medications as directed  Follow up with your PMD 2-3 days- bring copies of your results  Return to the ER for worsening  ice rest  incentive spirometer  EnglishSpanish                                                                                                                                                                       Rib Fracture       A rib fracture is a break or crack in one of the bones of the ribs. The ribs are like a cage that goes around your upper chest. A broken or cracked rib is often painful, but most do not cause other problems. Most rib fractures usually heal on their own in 1–3 months.      What are the causes?    •Doing movements over and over again with a lot of force, such as pitching a baseball or having a very bad cough.      •A direct hit to the chest.      •Cancer that has spread to the bones.        What are the signs or symptoms?    •Pain when you breathe in or cough.      •Pain when someone presses on the injured area.      •Feeling short of breath.        How is this treated?    Treatment depends on how bad the fracture is. In general:  •Most rib fractures usually heal on their own in 1–3 months.      •Healing may take longer if you have a cough or are doing activities that make the injury worse.      •While you heal, you may be given medicines to control pain.      •You will also be taught deep breathing exercises.      •Very bad injuries may require a stay at the hospital or surgery.        Follow these instructions at home:    Managing pain, stiffness, and swelling   •If told, put ice on the injured area. To do this:  •Put ice in a plastic bag.      •Place a towel between your skin and the bag.      •Leave the ice on for 20 minutes, 2–3 times a day.      •Take off the ice if your skin turns bright red. This is very important. If you cannot feel pain, heat, or cold, you have a greater risk of damage to the area.        •Take over-the-counter and prescription medicines only as told by your doctor.      Activity     •Avoid activities that cause pain to the injured area. Protect your injured area.      •Slowly increase activity as told by your doctor.      General instructions   •Do deep breathing exercises as told by your doctor. You may be told to:  •Take deep breaths many times a day.      •Cough several times a day while hugging a pillow.      •Use a device (incentive spirometer) to do deep breathing many times a day.        •Drink enough fluid to keep your pee (urine) clear or pale yellow.      • Do not wear a rib belt or binder.      •Keep all follow-up visits.        Contact a doctor if:    •You have a fever.        Get help right away if:    •You have trouble breathing.      •You are short of breath.      •You cannot stop coughing.      •You cough up thick or bloody spit.      •You feel like you may vomit (nauseous), vomit, or have belly (abdominal) pain.      •Your pain gets worse and medicine does not help.      These symptoms may be an emergency. Get help right away. Call your local emergency services (911 in the [place]   • Do not wait to see if the symptoms will go away.       • Do not drive yourself to the hospital.         Summary    •A rib fracture is a break or crack in one of the bones of the ribs.      •Apply ice to the injured area and take medicines for pain as told by your doctor.      •Take deep breaths and cough several times a day. Hug a pillow every time you cough.      This information is not intended to replace advice given to you by your health care provider. Make sure you discuss any questions you have with your health care provider.      Document Revised: 04/09/2021 Document Reviewed: 04/09/2021    Elsevier Patient Education © 2022 Elsevier Inc.

## 2022-03-21 NOTE — ED PROVIDER NOTE - CLINICAL SUMMARY MEDICAL DECISION MAKING FREE TEXT BOX
89 y m s/p fall  L chest and abd injury 89 y m s/p fall  L chest and abd injury  single 10 th rib fx L

## 2022-03-21 NOTE — ED PROVIDER NOTE - CARE PROVIDER_API CALL
Francis Kruger)  Surgery  [address], [address]  [place] [address]  Phone: [phone]  Fax: [phone]  Follow Up Time:

## 2022-03-21 NOTE — ED ADULT NURSE NOTE - OBJECTIVE STATEMENT
Patient states to have fallen over a week ago when coming down the steps and hitting the side rail with his L side rib. Complaint of constant pain around the area. Took tylenols that had minimal effect. Denies LOC or hitting his head. Complaint of 6/10 pain to the area. Denies taking any blood thinner. Patient states to have fallen over a week ago when coming down the steps and hitting the side rail with his L side rib. Complaint of constant pain around the area. Took tylenols that had minimal effect. Denies LOC or hitting his head. Complaint of 6/10 pain to the area. Denies taking any blood thinner. Denies any difficulty breathing/SOB.

## 2022-03-21 NOTE — ED PROVIDER NOTE - CARE PLAN
Goal:	fall , L chest injury, LUQ abdominal pain   1 Principal Discharge DX:	Closed traumatic minimally displaced fracture of two ribs of left side

## 2022-03-21 NOTE — ED PROVIDER NOTE - PATIENT PORTAL LINK FT
You can access the FollowMyHealth Patient Portal offered by [facility] by registering at the following website: [email or web address]. By joining [facility]’s FollowMyHealth portal, you will also be able to view your health information using other applications (apps) compatible with our system.

## 2022-03-21 NOTE — ED PROVIDER NOTE - OBJECTIVE STATEMENT
89 y m hx of htn lung cancer former smoker cc pain L lower chest LUQ abdomen afrer fall hit L chest on steps 89 y m hx of htn lung cancer former smoker cc pain L lower chest LUQ abdomen afrer fall hit L chest on steps  onset sudden   locations L chest L abd  duration 1 day   characteristics L chest L abd pain   context fall down steps  aggravating factors motion and deep breath   relieving factors rest   timming  constant   severity moderate and severe

## 2022-03-21 NOTE — ED PROVIDER NOTE - PHYSICAL EXAMINATION
General:     NAD, well-nourished, well-appearing  Head:     NC/AT, EOMI, oral mucosa moist  Neck:     trachea midline  Lungs:     CTA b/l, no w/r/r + tenderness L chest wall severe   CVS:     S1S2, RRR, no m/g/r  Abd:     +BS, s/nt/nd, no organomegaly + LUQ tenderness  Ext:    2+ radial and pedal pulses, no c/c/e  Neuro: AAOx3, no sensory/motor deficits
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
## 2022-03-22 ENCOUNTER — APPOINTMENT (OUTPATIENT)
Dept: SURGERY | Facility: CLINIC | Age: 87
End: 2022-03-22
Payer: MEDICARE

## 2022-03-22 VITALS
RESPIRATION RATE: 14 BRPM | TEMPERATURE: 98.2 F | OXYGEN SATURATION: 96 % | SYSTOLIC BLOOD PRESSURE: 118 MMHG | HEIGHT: 68 IN | BODY MASS INDEX: 23.34 KG/M2 | DIASTOLIC BLOOD PRESSURE: 60 MMHG | HEART RATE: 68 BPM | WEIGHT: 154 LBS

## 2022-03-22 PROCEDURE — 99203 OFFICE O/P NEW LOW 30 MIN: CPT

## 2022-03-25 NOTE — CHART NOTE - NSCHARTNOTEFT_GEN_A_CORE
SW placed call to patient to discuss and assist with follow up care.  Patient presented to ED on 3/21/22 due to a fall.  While in ED, MIGUEL placed call to Dr Kruger [phone] and scheduled follow up for 3/21/22 at 3:00 PM.  Patient reports he is still having rib pain, but is doing OK.  Patient confirmed he attended follow up appt with Dr Kruger.  Denied needing further SW assistance.  Patient encouraged to call if further assistance is needed.
SW met with patient at bedside to assess for social work needs and to complete home safety assessment.  Patient is an 89 year old male presenting to ED due to a fall.  Patients wife also at bedside.  SW introduced self and role of SW.  Patient reports that he was walking up steps from basement and slipped on the carpet.  Patient denies any other recent falls.  Patient denies issues ambulating, feels safe to do so. Patient reports that he is independent, does not require any DME or home care assistance.  SW discussed fall prevention checklist and discussed safety techniques to prevent carpet falls. Patient reports 5 steps to enter into house with handrail and 0 steps to bedroom.  Patient reports that his wife can assist as needed.  Wife and patient deny any safety concerns in the home.  Wife can provide transportation for patient to follow up appointment.  MIGUEL spoke with MD Reynoso who is requested follow up with surgeon Dr Kruger.  MIGUEL placed call to Dr Kruger [phone] and scheduled follow up with tomorrow 3/21/22 at 3:00 PM. Patient and wife in agreement with appt day and time.   SW advised patient that SW will call to follow up and assist as needed.  No social work interventions needed at this time.

## 2022-03-30 ENCOUNTER — APPOINTMENT (OUTPATIENT)
Dept: ELECTROPHYSIOLOGY | Facility: CLINIC | Age: 87
End: 2022-03-30

## 2022-03-31 ENCOUNTER — APPOINTMENT (OUTPATIENT)
Dept: ELECTROPHYSIOLOGY | Facility: CLINIC | Age: 87
End: 2022-03-31

## 2022-04-13 ENCOUNTER — APPOINTMENT (OUTPATIENT)
Dept: ELECTROPHYSIOLOGY | Facility: CLINIC | Age: 87
End: 2022-04-13

## 2022-04-20 ENCOUNTER — FORM ENCOUNTER (OUTPATIENT)
Age: 87
End: 2022-04-20

## 2022-04-22 ENCOUNTER — APPOINTMENT (OUTPATIENT)
Dept: CARDIOLOGY | Facility: CLINIC | Age: 87
End: 2022-04-22
Payer: MEDICARE

## 2022-04-22 ENCOUNTER — NON-APPOINTMENT (OUTPATIENT)
Age: 87
End: 2022-04-22

## 2022-04-22 VITALS
WEIGHT: 160 LBS | RESPIRATION RATE: 18 BRPM | TEMPERATURE: 98.1 F | DIASTOLIC BLOOD PRESSURE: 56 MMHG | HEIGHT: 68 IN | SYSTOLIC BLOOD PRESSURE: 130 MMHG | OXYGEN SATURATION: 98 % | HEART RATE: 71 BPM | BODY MASS INDEX: 24.25 KG/M2

## 2022-04-22 PROCEDURE — 99214 OFFICE O/P EST MOD 30 MIN: CPT

## 2022-04-22 PROCEDURE — 93000 ELECTROCARDIOGRAM COMPLETE: CPT

## 2022-04-26 ENCOUNTER — NON-APPOINTMENT (OUTPATIENT)
Age: 87
End: 2022-04-26

## 2022-04-26 ENCOUNTER — APPOINTMENT (OUTPATIENT)
Dept: ELECTROPHYSIOLOGY | Facility: CLINIC | Age: 87
End: 2022-04-26
Payer: MEDICARE

## 2022-04-26 PROCEDURE — 93294 REM INTERROG EVL PM/LDLS PM: CPT

## 2022-04-26 PROCEDURE — 93296 REM INTERROG EVL PM/IDS: CPT

## 2022-07-27 ENCOUNTER — APPOINTMENT (OUTPATIENT)
Dept: ELECTROPHYSIOLOGY | Facility: CLINIC | Age: 87
End: 2022-07-27

## 2022-07-27 ENCOUNTER — NON-APPOINTMENT (OUTPATIENT)
Age: 87
End: 2022-07-27

## 2022-07-27 PROCEDURE — 93294 REM INTERROG EVL PM/LDLS PM: CPT

## 2022-07-27 PROCEDURE — 93296 REM INTERROG EVL PM/IDS: CPT

## 2022-08-23 ENCOUNTER — APPOINTMENT (OUTPATIENT)
Dept: CARDIOLOGY | Facility: CLINIC | Age: 87
End: 2022-08-23

## 2022-09-06 ENCOUNTER — APPOINTMENT (OUTPATIENT)
Dept: CARDIOLOGY | Facility: CLINIC | Age: 87
End: 2022-09-06

## 2022-09-06 ENCOUNTER — NON-APPOINTMENT (OUTPATIENT)
Age: 87
End: 2022-09-06

## 2022-09-06 VITALS
SYSTOLIC BLOOD PRESSURE: 160 MMHG | BODY MASS INDEX: 24.25 KG/M2 | DIASTOLIC BLOOD PRESSURE: 71 MMHG | HEIGHT: 68 IN | HEART RATE: 72 BPM | WEIGHT: 160 LBS | RESPIRATION RATE: 19 BRPM | TEMPERATURE: 97.2 F | OXYGEN SATURATION: 100 %

## 2022-09-06 PROCEDURE — 99215 OFFICE O/P EST HI 40 MIN: CPT

## 2022-09-06 PROCEDURE — 93306 TTE W/DOPPLER COMPLETE: CPT

## 2022-09-06 PROCEDURE — 93000 ELECTROCARDIOGRAM COMPLETE: CPT

## 2022-09-07 ENCOUNTER — APPOINTMENT (OUTPATIENT)
Dept: CARDIOLOGY | Facility: CLINIC | Age: 87
End: 2022-09-07

## 2022-09-07 DIAGNOSIS — I35.0 NONRHEUMATIC AORTIC (VALVE) STENOSIS: ICD-10-CM

## 2022-09-07 PROCEDURE — 93288 INTERROG EVL PM/LDLS PM IP: CPT

## 2022-09-07 NOTE — PROCEDURE
[Pacemaker] : pacemaker [Voltage: ___ volts] : Voltage was [unfilled] volts [Longevity: ___ months] : The estimated remaining battery life is [unfilled] months [Lead Imp:  ___ohms] : lead impedance was [unfilled] ohms [___V @] : [unfilled] V [___ ms] : [unfilled] ms [Sense ___ %] : Sense [unfilled]% [Pace ___ %] : Pace [unfilled]% [de-identified] : MEDTRONIC [de-identified] : ELLIOT WILKINSON [id number] [de-identified] : [id number] [de-identified] : 9/16/2020 [de-identified] : SHIVANI [de-identified] :  [de-identified] : NORMAL PACER FUNCTION

## 2022-10-12 ENCOUNTER — OUTPATIENT (OUTPATIENT)
Dept: OUTPATIENT SERVICES | Facility: HOSPITAL | Age: 87
LOS: 1 days | End: 2022-10-12
Payer: MEDICARE

## 2022-10-12 ENCOUNTER — RESULT REVIEW (OUTPATIENT)
Age: 87
End: 2022-10-12

## 2022-10-12 ENCOUNTER — APPOINTMENT (OUTPATIENT)
Dept: CT IMAGING | Facility: HOSPITAL | Age: 87
End: 2022-10-12

## 2022-10-12 DIAGNOSIS — C34.32 MALIGNANT NEOPLASM OF LOWER LOBE, LEFT BRONCHUS OR LUNG: ICD-10-CM

## 2022-10-12 DIAGNOSIS — C34.31 MALIGNANT NEOPLASM OF LOWER LOBE, RIGHT BRONCHUS OR LUNG: ICD-10-CM

## 2022-10-12 DIAGNOSIS — Z98.89 OTHER SPECIFIED POSTPROCEDURAL STATES: Chronic | ICD-10-CM

## 2022-10-12 PROCEDURE — 71250 CT THORAX DX C-: CPT

## 2022-10-12 PROCEDURE — 71250 CT THORAX DX C-: CPT | Mod: 26,MH

## 2022-10-16 PROBLEM — C34.32 PRIMARY ADENOCARCINOMA OF LOWER LOBE OF LEFT LUNG: Status: ACTIVE | Noted: 2017-10-09

## 2022-10-16 PROBLEM — C34.31 PRIMARY ADENOCARCINOMA OF LOWER LOBE OF RIGHT LUNG: Status: ACTIVE | Noted: 2017-10-09

## 2022-10-17 ENCOUNTER — APPOINTMENT (OUTPATIENT)
Dept: THORACIC SURGERY | Facility: CLINIC | Age: 87
End: 2022-10-17

## 2022-10-17 VITALS
WEIGHT: 152 LBS | BODY MASS INDEX: 23.04 KG/M2 | HEIGHT: 68 IN | DIASTOLIC BLOOD PRESSURE: 62 MMHG | SYSTOLIC BLOOD PRESSURE: 172 MMHG | OXYGEN SATURATION: 99 % | HEART RATE: 85 BPM

## 2022-10-17 DIAGNOSIS — S22.49XA MULTIPLE FRACTURES OF RIBS, UNSPECIFIED SIDE, INITIAL ENCOUNTER FOR CLOSED FRACTURE: ICD-10-CM

## 2022-10-17 DIAGNOSIS — C34.32 MALIGNANT NEOPLASM OF LOWER LOBE, LEFT BRONCHUS OR LUNG: ICD-10-CM

## 2022-10-17 DIAGNOSIS — C34.31 MALIGNANT NEOPLASM OF LOWER LOBE, RIGHT BRONCHUS OR LUNG: ICD-10-CM

## 2022-10-17 PROCEDURE — 99214 OFFICE O/P EST MOD 30 MIN: CPT

## 2022-10-19 NOTE — REASON FOR VISIT
Patient arrives via EMS. Per EMS, patient was painting a door outside of his home, and \"I felt like my heart was dancing. \"    Reported 3/10 chest pain, upon EMS arrival.  After sitting in ambulance, reported chest pain ended, but reported dizziness. EKG on EMS 12 lead bundle branch block. 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 Has had open heart surgery. Patient is A&O x 4. Per EMS, patient has cough for past month since quitting smoking. Patient took 81 mg of aspirin at home. EMS gave 243 mg of aspirin en route. [FreeTextEntry1] : Lung cancer

## 2022-10-19 NOTE — HISTORY OF PRESENT ILLNESS
[FreeTextEntry1] : Mr. Lindsey is an 89 year old male. He is s/p Left VATS, LLL wedge resection on 7/9/2015 for Stage IA (pT1b pN0 pMx) adenocarcinoma, and s/p Right VATS, RLL wedge resection on 1/30/2014 for Stage IA (pT1a pN0 pMx) adenocarcinoma.\par \par Of note, patient is s/p TAVR and insertion of a Micra pacemaker on 09/16/2020 by Dr. Braden Sadler (CT surgery). \par \par Patient tripped on rug and went to ER on 03/21/2022, CT chest showed rib fractures. \par \par CT chest on 03/21/2022:\par - Nondisplaced left posterior lateral 10th rib fracture. New trace left pleural effusion. No pneumothorax.\par - Osteoporosis. Markedly heterogeneous lumbar vertebrae. Cannot exclude malignancy.\par - Del Gamal pacemaker in the right ventricle. TAPVR.\par \par CT chest on 10/12/2022:\par - Bilateral lower lobe wedge resections with stable postoperative change.\par - No evidence of thoracic tumor recurrence or metastasis.\par \par Patient is here today for a year follow up. Patient denies shortness of breath, cough, chest pain, fever, chills.

## 2022-10-19 NOTE — CONSULT LETTER
[FreeTextEntry2] : Dr. Liliana Hamilton (PCP)\par Dr. Brayden Morales (Cardio)\par Dr. Kedar Duran (Pulm) [FreeTextEntry3] : Casimiro Rai MD \par Attending Surgeon \par Division of Thoracic Surgery \par , Cardiovascular and Thoracic Surgery \par [facility] at [facility]\par \par

## 2022-10-19 NOTE — ASSESSMENT
[FreeTextEntry1] : Mr. Lindsey is an 89 year old male. He is s/p Left VATS, LLL wedge resection on 7/9/2015 for Stage IA (pT1b pN0 pMx) adenocarcinoma, and s/p Right VATS, RLL wedge resection on 1/30/2014 for Stage IA (pT1a pN0 pMx) adenocarcinoma.\par \par I have reviewed the patient's medical records and diagnostic images at time of this office consultation and have made the following recommendation:\par 1. CT chest reviewed and explained to patient, stable scan, I recommended patient to return to office in 12  months with CT Chest without contrast. \par \par I personally performed the services described in the documentation, reviewed the documentation recorded by the scribe in my presence and it accurately and completely records my words and actions.\par \par I, Jacque Mitchell NP, am scribing for and the presence of DAKOTA Hansen, the following sections HISTORY OF PRESENT ILLNESS, PAST MEDICAL/FAMILY/SOCIAL HISTORY; REVIEW OF SYSTEMS; VITAL SIGNS; PHYSICAL EXAM; DISPOSITION.

## 2022-10-26 ENCOUNTER — APPOINTMENT (OUTPATIENT)
Dept: ELECTROPHYSIOLOGY | Facility: CLINIC | Age: 87
End: 2022-10-26

## 2022-11-04 ENCOUNTER — NON-APPOINTMENT (OUTPATIENT)
Age: 87
End: 2022-11-04

## 2022-11-04 ENCOUNTER — APPOINTMENT (OUTPATIENT)
Dept: ELECTROPHYSIOLOGY | Facility: CLINIC | Age: 87
End: 2022-11-04

## 2022-11-04 VITALS
DIASTOLIC BLOOD PRESSURE: 67 MMHG | BODY MASS INDEX: 23.04 KG/M2 | SYSTOLIC BLOOD PRESSURE: 144 MMHG | WEIGHT: 152 LBS | OXYGEN SATURATION: 99 % | HEIGHT: 68 IN | HEART RATE: 91 BPM

## 2022-11-04 PROCEDURE — 93279 PRGRMG DEV EVAL PM/LDLS PM: CPT

## 2022-11-04 PROCEDURE — 93000 ELECTROCARDIOGRAM COMPLETE: CPT | Mod: 59

## 2023-01-03 ENCOUNTER — APPOINTMENT (OUTPATIENT)
Dept: CARDIOLOGY | Facility: CLINIC | Age: 88
End: 2023-01-03
Payer: MEDICARE

## 2023-01-03 ENCOUNTER — FORM ENCOUNTER (OUTPATIENT)
Age: 88
End: 2023-01-03

## 2023-01-03 ENCOUNTER — NON-APPOINTMENT (OUTPATIENT)
Age: 88
End: 2023-01-03

## 2023-01-03 VITALS
WEIGHT: 152 LBS | SYSTOLIC BLOOD PRESSURE: 148 MMHG | RESPIRATION RATE: 18 BRPM | HEART RATE: 88 BPM | OXYGEN SATURATION: 100 % | HEIGHT: 68 IN | BODY MASS INDEX: 23.04 KG/M2 | DIASTOLIC BLOOD PRESSURE: 71 MMHG | TEMPERATURE: 97.4 F

## 2023-01-03 VITALS
BODY MASS INDEX: 23.04 KG/M2 | DIASTOLIC BLOOD PRESSURE: 70 MMHG | HEART RATE: 88 BPM | WEIGHT: 152 LBS | OXYGEN SATURATION: 100 % | RESPIRATION RATE: 16 BRPM | SYSTOLIC BLOOD PRESSURE: 136 MMHG | HEIGHT: 68 IN

## 2023-01-03 PROCEDURE — 99215 OFFICE O/P EST HI 40 MIN: CPT

## 2023-01-03 PROCEDURE — 93000 ELECTROCARDIOGRAM COMPLETE: CPT

## 2023-01-03 NOTE — HISTORY OF PRESENT ILLNESS
[FreeTextEntry1] : He has been at his baseline since his last visit with me.\par He tries to go for regular walks for about 20 to 30 minutes at a time, denies any cardiac limitations.\par No chest pain or chest pressure.  No shortness of breath or dyspnea on exertion.  No palpitations.  No dizziness, lightheadedness, syncope or near syncope.  No edema, no orthopnea.\par Remains compliant with current medications.\par \par

## 2023-01-03 NOTE — REASON FOR VISIT
[FreeTextEntry1] : Cardiology follow-up visit for evaluation management of hypertension, abnormal EKG, status post TAVR, status post pacemaker (Micra).\par

## 2023-01-03 NOTE — CARDIOLOGY SUMMARY
[de-identified] : Gab 3, 2023. Sinus  Rhythm \par -Right bundle branch block \par ABNORMAL  [de-identified] : Sept 6, 2022. s/p bio AV. Normal LV function  [de-identified] : Sept 16, 2020. s/p TAVR and Micra PM

## 2023-01-03 NOTE — PHYSICAL EXAM
[Normal S1, S2] : normal S1, S2 [No Rub] : no rub [No Gallop] : no gallop [Murmur] : murmur [Normal] : alert and oriented, normal memory [de-identified] : l/Vl systolic

## 2023-01-03 NOTE — REVIEW OF SYSTEMS
[Feeling Fatigued] : feeling fatigued [Constipation] : constipation [Joint Pain] : joint pain [Joint Stiffness] : joint stiffness [Negative] : Heme/Lymph [FreeTextEntry8] : difficulty urinating

## 2023-01-03 NOTE — DISCUSSION/SUMMARY
[FreeTextEntry1] : 89-year-old man with history of hypertension, abnormal EKG, status post remote TAVR, status post permanent pacemaker (Micra).\par He has been asymptomatic with respect to cardiac issues and has a good functional capacity.\par Blood pressure stable on today's examination.  There is no JVD.  Lung fields are clear.  No edema.  He is euvolemic.\par EKG sinus rhythm, right bundle branch block, nonspecific ST changes, unchanged from prior.\par Current cardiac status appears to be stable.\par \par Plan\par 1.  Current medication list is reviewed, no changes.\par 2.  He will follow-up with me in the office in 6 months.\par 3.  Continue close pacemaker follow-up in the office.\par 4.  Advised him to monitor for any cardiac symptoms and to notify me if there are any changes or if he has any other concerns.  Cardiac issues were discussed, all questions answered.\par

## 2023-02-03 ENCOUNTER — NON-APPOINTMENT (OUTPATIENT)
Age: 88
End: 2023-02-03

## 2023-02-03 ENCOUNTER — APPOINTMENT (OUTPATIENT)
Dept: ELECTROPHYSIOLOGY | Facility: CLINIC | Age: 88
End: 2023-02-03
Payer: MEDICARE

## 2023-02-03 PROCEDURE — 93296 REM INTERROG EVL PM/IDS: CPT

## 2023-02-03 PROCEDURE — 93294 REM INTERROG EVL PM/LDLS PM: CPT

## 2023-04-12 NOTE — ED ADULT NURSE NOTE - DRUG PRE-SCREENING (DAST -1)
Statement Selected EKG done on 4/12/2023 at 12:20 PM that reveals sinus bradycardia at rate of 56 axis is leftward NH interval 178 QRS 98 QTc 432 no ST segment elevation or depression however there is flattening in 3, aVF as well as V2.

## 2023-05-05 ENCOUNTER — APPOINTMENT (OUTPATIENT)
Dept: ELECTROPHYSIOLOGY | Facility: CLINIC | Age: 88
End: 2023-05-05

## 2023-06-06 ENCOUNTER — APPOINTMENT (OUTPATIENT)
Dept: ELECTROPHYSIOLOGY | Facility: CLINIC | Age: 88
End: 2023-06-06

## 2023-06-07 ENCOUNTER — FORM ENCOUNTER (OUTPATIENT)
Age: 88
End: 2023-06-07

## 2023-06-23 ENCOUNTER — APPOINTMENT (OUTPATIENT)
Dept: ELECTROPHYSIOLOGY | Facility: CLINIC | Age: 88
End: 2023-06-23
Payer: MEDICARE

## 2023-06-23 ENCOUNTER — NON-APPOINTMENT (OUTPATIENT)
Age: 88
End: 2023-06-23

## 2023-06-23 PROCEDURE — 93294 REM INTERROG EVL PM/LDLS PM: CPT

## 2023-06-23 PROCEDURE — 93296 REM INTERROG EVL PM/IDS: CPT

## 2023-07-03 ENCOUNTER — APPOINTMENT (OUTPATIENT)
Dept: CARDIOLOGY | Facility: CLINIC | Age: 88
End: 2023-07-03

## 2023-07-05 ENCOUNTER — APPOINTMENT (OUTPATIENT)
Dept: ULTRASOUND IMAGING | Facility: HOSPITAL | Age: 88
End: 2023-07-05
Payer: MEDICARE

## 2023-07-05 ENCOUNTER — OUTPATIENT (OUTPATIENT)
Dept: OUTPATIENT SERVICES | Facility: HOSPITAL | Age: 88
LOS: 1 days | End: 2023-07-05
Payer: MEDICARE

## 2023-07-05 DIAGNOSIS — Z00.8 ENCOUNTER FOR OTHER GENERAL EXAMINATION: ICD-10-CM

## 2023-07-05 DIAGNOSIS — Z98.89 OTHER SPECIFIED POSTPROCEDURAL STATES: Chronic | ICD-10-CM

## 2023-07-05 PROCEDURE — 76770 US EXAM ABDO BACK WALL COMP: CPT

## 2023-07-05 PROCEDURE — 76770 US EXAM ABDO BACK WALL COMP: CPT | Mod: 26

## 2023-08-07 ENCOUNTER — APPOINTMENT (OUTPATIENT)
Dept: CARDIOLOGY | Facility: CLINIC | Age: 88
End: 2023-08-07

## 2023-08-18 ENCOUNTER — NON-APPOINTMENT (OUTPATIENT)
Age: 88
End: 2023-08-18

## 2023-08-18 ENCOUNTER — APPOINTMENT (OUTPATIENT)
Dept: CARDIOLOGY | Facility: CLINIC | Age: 88
End: 2023-08-18
Payer: MEDICARE

## 2023-08-18 VITALS
SYSTOLIC BLOOD PRESSURE: 139 MMHG | BODY MASS INDEX: 21.22 KG/M2 | HEART RATE: 63 BPM | DIASTOLIC BLOOD PRESSURE: 52 MMHG | OXYGEN SATURATION: 98 % | HEIGHT: 68 IN | WEIGHT: 140 LBS

## 2023-08-18 PROCEDURE — 99215 OFFICE O/P EST HI 40 MIN: CPT

## 2023-08-18 PROCEDURE — 93000 ELECTROCARDIOGRAM COMPLETE: CPT

## 2023-08-18 NOTE — PHYSICAL EXAM
[Normal S1, S2] : normal S1, S2 [No Rub] : no rub [No Gallop] : no gallop [Murmur] : murmur [Normal] : alert and oriented, normal memory [de-identified] : l/Vl systolic

## 2023-08-18 NOTE — HISTORY OF PRESENT ILLNESS
[FreeTextEntry1] : Since his last visit with me, he reports being at his baseline. Mostly sedentary lifestyle. He has no complaints of chest pain or chest pressure.  No shortness of breath or dyspnea on exertion.  Denies palpitations.  No dizziness, lightheadedness, syncope or near syncope.  No edema, orthopnea.  No PND. [facility] also [facility].  
Refusal of blood transfusions as patient is L[employer]

## 2023-08-18 NOTE — CARDIOLOGY SUMMARY
[de-identified] : August 18, 2023. . Sinus  Rhythm  -Right bundle branch block  ABNORMAL  [de-identified] : Sept 6, 2022. s/p bio AV. Normal LV function  [de-identified] : Sept 16, 2020. s/p TAVR and Micra PM

## 2023-08-18 NOTE — REVIEW OF SYSTEMS
[Feeling Fatigued] : feeling fatigued [Constipation] : constipation [Joint Stiffness] : joint stiffness [Joint Pain] : joint pain [Negative] : Heme/Lymph [FreeTextEntry8] : difficulty urinating

## 2023-08-18 NOTE — REASON FOR VISIT
[FreeTextEntry1] : Cardiology follow-up visit for evaluation management of hypertension, abnormal EKG, status post TAVR, status post pacemaker (Micra).

## 2023-08-18 NOTE — DISCUSSION/SUMMARY
[FreeTextEntry1] : 89-year-old man with history of hypertension, abnormal EKG.  Status post TAVR for severe aortic stenosis.  Status post permanent pacemaker (Micra). He has a sedentary lifestyle and has been asymptomatic with respect to cardiac issues. Blood pressure stable.  There is no JVD.  Lung fields clear.  No edema.  He is euvolemic. EKG is sinus rhythm.  Right bundle branch block. Current cardiac status appears to be at its baseline.  Plan 1.  Current medication list is reviewed, no changes. 2.  He will continue to have close pacemaker follow-up in the office. 3.  Next visit with me in the office in 6 months.  Echocardiogram at next visit. 4.  Advised him to monitor for cardiac symptoms, report back to me if there are any changes or if he has any other concerns.  Cardiac issues were discussed, all questions have been answered to his satisfaction.

## 2023-09-20 LAB
ALBUMIN SERPL ELPH-MCNC: 4.2 G/DL
ALP BLD-CCNC: 70 U/L
ALT SERPL-CCNC: 11 U/L
ANION GAP SERPL CALC-SCNC: 12 MMOL/L
AST SERPL-CCNC: 16 U/L
BASOPHILS # BLD AUTO: 0.03 K/UL
BASOPHILS NFR BLD AUTO: 0.5 %
BILIRUB SERPL-MCNC: 0.4 MG/DL
BUN SERPL-MCNC: 23 MG/DL
CALCIUM SERPL-MCNC: 9.8 MG/DL
CHLORIDE SERPL-SCNC: 104 MMOL/L
CO2 SERPL-SCNC: 21 MMOL/L
CREAT SERPL-MCNC: 1.38 MG/DL
EOSINOPHIL # BLD AUTO: 0.17 K/UL
EOSINOPHIL NFR BLD AUTO: 3 %
GLUCOSE SERPL-MCNC: 107 MG/DL
HCT VFR BLD CALC: 29.7 %
HGB BLD-MCNC: 9.1 G/DL
IMM GRANULOCYTES NFR BLD AUTO: 0.4 %
LYMPHOCYTES # BLD AUTO: 1.16 K/UL
LYMPHOCYTES NFR BLD AUTO: 20.6 %
MAN DIFF?: NORMAL
MCHC RBC-ENTMCNC: 25.4 PG
MCHC RBC-ENTMCNC: 30.6 GM/DL
MCV RBC AUTO: 83 FL
MONOCYTES # BLD AUTO: 0.6 K/UL
MONOCYTES NFR BLD AUTO: 10.7 %
NEUTROPHILS # BLD AUTO: 3.65 K/UL
NEUTROPHILS NFR BLD AUTO: 64.8 %
NT-PROBNP SERPL-MCNC: 923 PG/ML
PLATELET # BLD AUTO: 183 K/UL
POTASSIUM SERPL-SCNC: 4.9 MMOL/L
PROT SERPL-MCNC: 7 G/DL
RBC # BLD: 3.58 M/UL
RBC # FLD: 18.7 %
SODIUM SERPL-SCNC: 137 MMOL/L
WBC # FLD AUTO: 5.63 K/UL

## 2023-09-22 ENCOUNTER — NON-APPOINTMENT (OUTPATIENT)
Age: 88
End: 2023-09-22

## 2023-09-22 ENCOUNTER — APPOINTMENT (OUTPATIENT)
Dept: ELECTROPHYSIOLOGY | Facility: CLINIC | Age: 88
End: 2023-09-22
Payer: MEDICARE

## 2023-09-22 PROCEDURE — 93296 REM INTERROG EVL PM/IDS: CPT

## 2023-09-22 PROCEDURE — 93294 REM INTERROG EVL PM/LDLS PM: CPT

## 2023-11-06 ENCOUNTER — APPOINTMENT (OUTPATIENT)
Dept: ELECTROPHYSIOLOGY | Facility: CLINIC | Age: 88
End: 2023-11-06

## 2023-11-20 ENCOUNTER — APPOINTMENT (OUTPATIENT)
Dept: THORACIC SURGERY | Facility: CLINIC | Age: 88
End: 2023-11-20

## 2024-02-05 ENCOUNTER — APPOINTMENT (OUTPATIENT)
Dept: ELECTROPHYSIOLOGY | Facility: CLINIC | Age: 89
End: 2024-02-05
Payer: MEDICARE

## 2024-02-05 ENCOUNTER — NON-APPOINTMENT (OUTPATIENT)
Age: 89
End: 2024-02-05

## 2024-02-05 PROCEDURE — 93296 REM INTERROG EVL PM/IDS: CPT

## 2024-02-05 PROCEDURE — 93294 REM INTERROG EVL PM/LDLS PM: CPT

## 2024-02-20 ENCOUNTER — NON-APPOINTMENT (OUTPATIENT)
Age: 89
End: 2024-02-20

## 2024-02-20 ENCOUNTER — APPOINTMENT (OUTPATIENT)
Dept: CARDIOLOGY | Facility: CLINIC | Age: 89
End: 2024-02-20
Payer: MEDICARE

## 2024-02-20 VITALS
RESPIRATION RATE: 17 BRPM | HEART RATE: 64 BPM | SYSTOLIC BLOOD PRESSURE: 189 MMHG | DIASTOLIC BLOOD PRESSURE: 62 MMHG | BODY MASS INDEX: 21.22 KG/M2 | TEMPERATURE: 95.2 F | HEIGHT: 68 IN | WEIGHT: 140 LBS | OXYGEN SATURATION: 98 %

## 2024-02-20 DIAGNOSIS — Z95.0 PRESENCE OF CARDIAC PACEMAKER: ICD-10-CM

## 2024-02-20 DIAGNOSIS — I10 ESSENTIAL (PRIMARY) HYPERTENSION: ICD-10-CM

## 2024-02-20 DIAGNOSIS — Z95.3 PRESENCE OF XENOGENIC HEART VALVE: ICD-10-CM

## 2024-02-20 DIAGNOSIS — R94.31 ABNORMAL ELECTROCARDIOGRAM [ECG] [EKG]: ICD-10-CM

## 2024-02-20 PROCEDURE — 93000 ELECTROCARDIOGRAM COMPLETE: CPT

## 2024-02-20 PROCEDURE — 99215 OFFICE O/P EST HI 40 MIN: CPT

## 2024-02-20 PROCEDURE — 93306 TTE W/DOPPLER COMPLETE: CPT

## 2024-02-20 NOTE — HISTORY OF PRESENT ILLNESS
[FreeTextEntry1] : Since last visit with me, he has been at his baseline. He does work around his house, does shopping but otherwise is not very active. No cardiac complaints.  No chest pain or chest pressure.  No shortness of breath.  Denies palpitations.  No dizziness or lightheadedness.  No syncope.  No edema, orthopnea.  No PND. Reports compliance to current medications. [facility]

## 2024-02-20 NOTE — PHYSICAL EXAM
[Normal S1, S2] : normal S1, S2 [No Rub] : no rub [No Gallop] : no gallop [Murmur] : murmur [Normal] : alert and oriented, normal memory [de-identified] : l/Vl systolic

## 2024-02-20 NOTE — CARDIOLOGY SUMMARY
[de-identified] : February 20, 2024.Sinus Bradycardia -First degree A-V block  Maxi = 220 -Right bundle branch block   [de-identified] : Sept 6, 2022. s/p bio AV. Normal LV function  [de-identified] : Sept 16, 2020. s/p TAVR and Micra PM

## 2024-02-20 NOTE — DISCUSSION/SUMMARY
[EKG obtained to assist in diagnosis and management of assessed problem(s)] : EKG obtained to assist in diagnosis and management of assessed problem(s) [FreeTextEntry1] : 89-year-old man with history of TAVR, status post pacemaker (Micra), hypertension, abnormal EKG. He has been asymptomatic with respect to cardiac issues. Blood pressure  elevated.  There is no JVD.  Lung fields are clear.  No edema.  He is euvolemic. EKG is sinus rhythm.  Right bundle branch block. Echocardiogram done today in the office.  Normal left ventricular systolic function.  Mild left ventricular hypertrophy.  Status post bioprosthetic aortic valve replacement, small pericardial effusion.  Plan 1.  Current medications reviewed, no changes. 2.  He will continue close pacemaker follow-up in the office. 3.  Follow-up with me in the office in 6 months. 4.  Advised to monitor for any cardiac symptoms and to report back to me for any changes or if he has any other concerns.  Cardiac issues were discussed with him, all of his questions have been answered to his satisfaction.

## 2024-03-18 NOTE — PATIENT PROFILE ADULT - NSPROIMPLANTSMEDDEV_GEN_A_NUR
None
glasses/Partially impaired: cannot see medication labels or newsprint, but can see obstacles in path, and the surrounding layout; can count fingers at arm's length

## 2024-05-06 ENCOUNTER — NON-APPOINTMENT (OUTPATIENT)
Age: 89
End: 2024-05-06

## 2024-05-06 ENCOUNTER — APPOINTMENT (OUTPATIENT)
Dept: ELECTROPHYSIOLOGY | Facility: CLINIC | Age: 89
End: 2024-05-06
Payer: MEDICARE

## 2024-05-06 PROCEDURE — 93296 REM INTERROG EVL PM/IDS: CPT

## 2024-05-06 PROCEDURE — 93294 REM INTERROG EVL PM/LDLS PM: CPT

## 2024-08-05 ENCOUNTER — APPOINTMENT (OUTPATIENT)
Dept: ELECTROPHYSIOLOGY | Facility: CLINIC | Age: 89
End: 2024-08-05

## 2024-08-06 ENCOUNTER — APPOINTMENT (OUTPATIENT)
Dept: ELECTROPHYSIOLOGY | Facility: CLINIC | Age: 89
End: 2024-08-06

## 2024-08-06 PROCEDURE — 93294 REM INTERROG EVL PM/LDLS PM: CPT

## 2024-08-06 PROCEDURE — 93296 REM INTERROG EVL PM/IDS: CPT

## 2024-08-20 ENCOUNTER — APPOINTMENT (OUTPATIENT)
Dept: CARDIOLOGY | Facility: CLINIC | Age: 89
End: 2024-08-20
Payer: MEDICARE

## 2024-08-20 ENCOUNTER — NON-APPOINTMENT (OUTPATIENT)
Age: 89
End: 2024-08-20

## 2024-08-20 VITALS
SYSTOLIC BLOOD PRESSURE: 198 MMHG | BODY MASS INDEX: 21.82 KG/M2 | WEIGHT: 144 LBS | DIASTOLIC BLOOD PRESSURE: 70 MMHG | HEIGHT: 68 IN | HEART RATE: 52 BPM | OXYGEN SATURATION: 97 %

## 2024-08-20 DIAGNOSIS — I10 ESSENTIAL (PRIMARY) HYPERTENSION: ICD-10-CM

## 2024-08-20 DIAGNOSIS — R94.31 ABNORMAL ELECTROCARDIOGRAM [ECG] [EKG]: ICD-10-CM

## 2024-08-20 DIAGNOSIS — Z95.3 PRESENCE OF XENOGENIC HEART VALVE: ICD-10-CM

## 2024-08-20 DIAGNOSIS — Z95.0 PRESENCE OF CARDIAC PACEMAKER: ICD-10-CM

## 2024-08-20 PROCEDURE — 99215 OFFICE O/P EST HI 40 MIN: CPT

## 2024-08-20 PROCEDURE — 93000 ELECTROCARDIOGRAM COMPLETE: CPT

## 2024-08-20 NOTE — CARDIOLOGY SUMMARY
[de-identified] : August 20, 2024.  Sinus bradycardia.  Right bundle branch block  [de-identified] : Sept 6, 2022. s/p bio AV. Normal LV function  [de-identified] : Sept 16, 2020. s/p TAVR and Micra PM

## 2024-08-20 NOTE — PHYSICAL EXAM
[Normal S1, S2] : normal S1, S2 [No Rub] : no rub [No Gallop] : no gallop [Murmur] : murmur [Normal] : alert and oriented, normal memory [de-identified] : l/Vl systolic

## 2024-08-20 NOTE — DISCUSSION/SUMMARY
[FreeTextEntry1] : 89-year-old man with hypertension, abnormal EKG, status post TAVR for severe aortic stenosis, status post permanent pacemaker (Micra). He has a moderately active lifestyle and has been asymptomatic with respect to cardiac issues. On physical examination today, the blood pressure is elevated.  He appears to be euvolemic.  No new cardiac murmurs rubs or gallops are noted. EKG sinus bradycardia.  Right bundle branch block.  Plan 1.  Reasonable to increase dose of hydralazine 25 mg twice daily for more optimal blood pressure management.  Prescription is sent to his pharmacy. 2.  Blood work is ordered.  Patient reports that he has not had blood work since several months, [facility]. 3.  No other changes in medications. 4.  He will follow-up with me in the office in 6 weeks for blood pressure check. 5.  The above was reviewed with him and all of his questions have been answered to his satisfaction.  [EKG obtained to assist in diagnosis and management of assessed problem(s)] : EKG obtained to assist in diagnosis and management of assessed problem(s)

## 2024-08-20 NOTE — HISTORY OF PRESENT ILLNESS
[FreeTextEntry1] : Since last visit with me, patient reports being at baseline. He has moderately active lifestyle, does housework, does gardening.  Denies any cardiac complaints. No complaints of chest pain or chest pressure.  No shortness of breath.  Denies palpitations.  No dizziness or lightheadedness.  No syncope.  No edema, orthopnea.  No PND.

## 2024-10-01 ENCOUNTER — APPOINTMENT (OUTPATIENT)
Dept: CARDIOLOGY | Facility: CLINIC | Age: 89
End: 2024-10-01
Payer: MEDICARE

## 2024-10-01 ENCOUNTER — NON-APPOINTMENT (OUTPATIENT)
Age: 89
End: 2024-10-01

## 2024-10-01 VITALS
HEIGHT: 68 IN | DIASTOLIC BLOOD PRESSURE: 72 MMHG | SYSTOLIC BLOOD PRESSURE: 180 MMHG | WEIGHT: 144 LBS | HEART RATE: 87 BPM | OXYGEN SATURATION: 96 % | RESPIRATION RATE: 18 BRPM | BODY MASS INDEX: 21.82 KG/M2

## 2024-10-01 DIAGNOSIS — I10 ESSENTIAL (PRIMARY) HYPERTENSION: ICD-10-CM

## 2024-10-01 DIAGNOSIS — Z95.3 PRESENCE OF XENOGENIC HEART VALVE: ICD-10-CM

## 2024-10-01 DIAGNOSIS — R94.31 ABNORMAL ELECTROCARDIOGRAM [ECG] [EKG]: ICD-10-CM

## 2024-10-01 DIAGNOSIS — Z95.0 PRESENCE OF CARDIAC PACEMAKER: ICD-10-CM

## 2024-10-01 PROCEDURE — 93000 ELECTROCARDIOGRAM COMPLETE: CPT

## 2024-10-01 PROCEDURE — 99212 OFFICE O/P EST SF 10 MIN: CPT

## 2024-10-01 NOTE — CARDIOLOGY SUMMARY
[de-identified] : Oct 1 2024.   Sinus Rhythm -First degree A-V block  Maxi = 228 -Right bundle branch block  ABNORMAL  [de-identified] : Sept 6, 2022. s/p bio AV. Normal LV function  [de-identified] : Sept 16, 2020. s/p TAVR and Micra PM

## 2024-10-01 NOTE — HISTORY OF PRESENT ILLNESS
[FreeTextEntry1] : Since last visit with me, he was started on hydralazine. He reports compliance with this medication. Denies any cardiac complaints.  No chest pain, chest pressure, shortness of breath.  No dyspnea on exertion.  No palpitations, dizziness, lightheadedness, syncope or near syncope.  No edema, orthopnea.  No PND.   chest pain

## 2024-10-01 NOTE — DISCUSSION/SUMMARY
[FreeTextEntry1] : 89-year-old man with hypertension on multiple medications.  Abnormal EKG, status post TAVR for severe arctic stenosis, status post Micra pacemaker. He has been asymptomatic with respect to cardiac issues. On physical examination, blood pressure remains elevated.  He is euvolemic.  No new cardiac murmurs rubs or gallops are noted per EKG sinus rhythm, first-degree block, right bundle branch block, largely unchanged from prior.  Plan 1.  Current medication list is reviewed, no changes. 2.  Recognize elevated blood pressure, however, there is concern regarding polypharmacy in this elderly man who is currently asymptomatic.  Therefore, continue with current medications for treatment of hypertension.  He will self monitor blood pressure readings at home and report back to me if elevated. 3.  Next visit in the office in 6 months. 4.  Advised to monitor for any cardiac symptoms and to report back to me if there are any changes or if he has any other concerns.  Cardiac issues were discussed and all of his questions have been answered to his satisfaction.  [EKG obtained to assist in diagnosis and management of assessed problem(s)] : EKG obtained to assist in diagnosis and management of assessed problem(s)

## 2024-10-01 NOTE — PHYSICAL EXAM
[Normal S1, S2] : normal S1, S2 [No Rub] : no rub [No Gallop] : no gallop [Murmur] : murmur [Normal] : alert and oriented, normal memory [de-identified] : l/Vl systolic

## 2024-10-27 ENCOUNTER — EMERGENCY (EMERGENCY)
Facility: HOSPITAL | Age: 89
LOS: 1 days | Discharge: LEFT BEFORE DISCHARGE | End: 2024-10-27
Attending: STUDENT IN AN ORGANIZED HEALTH CARE EDUCATION/TRAINING PROGRAM | Admitting: STUDENT IN AN ORGANIZED HEALTH CARE EDUCATION/TRAINING PROGRAM
Payer: MEDICARE

## 2024-10-27 VITALS
OXYGEN SATURATION: 100 % | TEMPERATURE: 98 F | SYSTOLIC BLOOD PRESSURE: 185 MMHG | HEIGHT: 64 IN | HEART RATE: 67 BPM | WEIGHT: 139.99 LBS | RESPIRATION RATE: 16 BRPM | DIASTOLIC BLOOD PRESSURE: 80 MMHG

## 2024-10-27 DIAGNOSIS — Z98.89 OTHER SPECIFIED POSTPROCEDURAL STATES: Chronic | ICD-10-CM

## 2024-10-27 PROCEDURE — 73140 X-RAY EXAM OF FINGER(S): CPT | Mod: 26,59,LT

## 2024-10-27 PROCEDURE — 99284 EMERGENCY DEPT VISIT MOD MDM: CPT

## 2024-10-27 PROCEDURE — 73130 X-RAY EXAM OF HAND: CPT | Mod: 26,LT

## 2024-10-27 RX ORDER — ACETAMINOPHEN 325 MG
650 TABLET ORAL ONCE
Refills: 0 | Status: COMPLETED | OUTPATIENT
Start: 2024-10-27 | End: 2024-10-27

## 2024-10-27 RX ADMIN — Medication 650 MILLIGRAM(S): at 16:49

## 2024-10-27 NOTE — ED PROVIDER NOTE - OBJECTIVE STATEMENT
89M pmhx of HTN, mild dementia, HLD, [place], presenting with left middle finger swelling starting on thursday. Daughter is historian. Reports noticing the patient's middle finger being swollen and brused, but he didn't recall any recent traumatic injuries to the finger. They iced the finger and improved over Friday, however then began to swell again and worsen since then until today. He reports mild pain from the base of the middle finger to the DIP a/w moderate swelling and difficulty moving the finger. Denies any known traumatic injuries, other extremity injuries, chest pain, shortness of breath, abdominal pain, fevers, chills, rashes, lacerations, abrasions, nausea/vomiting.

## 2024-10-27 NOTE — ED PROVIDER NOTE - STUDIES
Drug Assistance Documentation (Final Outcome)    Initial Referral Source Jenny BOGGS    Drug Assistance Evaluation Outcome Approved    Approved  Name of Program: [employer], Phone Number: [phone], Drug(s): Rydapt, Eligibility Dates: 1/1/2024 to 12/31/2024, Program type (Assistance or Replacement): Assistance, and Who Has Been Notified: Patient RN Pool    Next Steps Patient will continue to receive FREE Rydapt until 12/31/2024 from the [employer]  
Xray Image(s) - see wet read section for interpretation
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
## 2024-10-27 NOTE — ED PROVIDER NOTE - PHYSICAL EXAMINATION
VITAL SIGNS: I have reviewed nursing notes and confirm.   GEN: Well-developed; well-nourished; in no acute distress. Speaking full sentences.  SKIN: Warm, pink, no rash, no diaphoresis, no cyanosis, well perfused.   HEAD: Normocephalic; atraumatic.    NECK: Supple; non tender. Full range of motion.   EYES: Pupils 3mm equal, round, reactive to light and accomodation, conjunctiva and sclera clear.    ENT: No nasal discharge; airway clear. Trachea is midline.    CV: RRR. S1, S2 normal; no murmurs, gallops, or rubs. Capillary refill < 2 seconds throughout. Distal pulses intact 2+ throughout.  RESP: CTA bilaterally. No wheezes, rales, or rhonchi.   ABD: Normal bowel sounds, soft, non-distended, non-tender, no rebound, no guarding, no rigidity   MSK: Normal range of motion and movement of all 4 extremities.   EXCEPT: LEFT HAND: Sensation: SILT in FF/IF dorsal, proximal (radial), SF tip (ulnar), IF volar tip (median). Motor: (+) thumbs UP (radial), OK sign (median), and "V-sign"-with 2nd  fingers ulnar intact. Flexion and extension 1, 2, 4 and 5 against resistance intact, wrist and finger extension off table (radial) intact except for middle finger, finger [id number] (ulnar) intact except for middle finger, Thumb to pinky (median) intact. Vascular: Capillary refill <2sec in all digits. Compartments soft.  (+) middle finger moderate swelling with ecchymosis and deformity at the base of middle phalanges. Unable to flex or extend at the DIP and PIP and MCP joints. Cap refill distally < 2 sec. No lacerations or abrasions. Ecchymosis overlying the PIP joint of the middle finger.  NEURO: Alert & oriented x 3, Grossly unremarkable. Sensory and motor intact throughout. No focal deficits.  Normal speech and coordination.

## 2024-10-27 NOTE — ED PROVIDER NOTE - ENDOCRINE NEGATIVE STATEMENT, MLM
"Chief Complaint   Patient presents with     Sinus Problem       Initial /64 (BP Location: Left arm, Patient Position: Sitting, Cuff Size: Adult Regular)   Pulse 77   Temp 98.1  F (36.7  C) (Tympanic)   Ht 1.6 m (5' 3\")   Wt 75.3 kg (166 lb)   SpO2 98%   BMI 29.41 kg/m   Estimated body mass index is 29.41 kg/m  as calculated from the following:    Height as of this encounter: 1.6 m (5' 3\").    Weight as of this encounter: 75.3 kg (166 lb).  Medication Reconciliation: complete  Fawn Pretty LPN  "
no diabetes and no thyroid trouble.

## 2024-10-27 NOTE — ED PROVIDER NOTE - IV ALTEPLASE ADMIN OUTSIDE HIDDEN
Hide Include Location In Plan Question?: No Detail Level: Simple Additional Note: mid posterior scalp Detail Level: Zone show

## 2024-10-27 NOTE — ED PROVIDER NOTE - CLINICAL SUMMARY MEDICAL DECISION MAKING FREE TEXT BOX
Pt w/ PMHx HTN dementia [place] presenting with LEFT middle finger injury / swelling / pain since thursday, possible traumatic but patient's daughter reports he is forgetful and not a reliable historian. Exam and history concerning for but is not limited to: musculoskeletal pain but more likely fracture / dislocation. There is no evidence of deformity or joint instability. There are no open wounds overlying the affected site.   DDX includes but is not limited to: Rule out associated traumatic injuries, abrasions, lacerations, head trauma, neck trauma, open fractures.  PLAN:   -analgesia, ice packs & re-assess  -X-ray showing fracture dislocation of the middle phalanges of the middle finger. severely displaced.  - Case discussed w/ Dr. Lester Plastic surgery/hand surgery whom recommends reduction and splint. Upon re-evaluation at 1730 the patient and daughter was not in the room. I called the patient's wife, informed her that the patient left the ED without the results and treatment of the middle finger fracture. She reports he just returned home with the daughter due to the prolonged wait time. I recommended to come back as soon as possible for reduction and splinting of the finger. She verbalized understanding and will let the patient and daughter know.

## 2024-10-28 ENCOUNTER — EMERGENCY (EMERGENCY)
Facility: HOSPITAL | Age: 89
LOS: 1 days | Discharge: HOME | End: 2024-10-28
Attending: STUDENT IN AN ORGANIZED HEALTH CARE EDUCATION/TRAINING PROGRAM | Admitting: STUDENT IN AN ORGANIZED HEALTH CARE EDUCATION/TRAINING PROGRAM
Payer: MEDICARE

## 2024-10-28 VITALS
WEIGHT: 158.07 LBS | RESPIRATION RATE: 18 BRPM | TEMPERATURE: 99 F | OXYGEN SATURATION: 98 % | SYSTOLIC BLOOD PRESSURE: 170 MMHG | HEIGHT: 64 IN | DIASTOLIC BLOOD PRESSURE: 81 MMHG | HEART RATE: 63 BPM

## 2024-10-28 VITALS
SYSTOLIC BLOOD PRESSURE: 195 MMHG | OXYGEN SATURATION: 99 % | DIASTOLIC BLOOD PRESSURE: 67 MMHG | RESPIRATION RATE: 17 BRPM | HEART RATE: 69 BPM

## 2024-10-28 DIAGNOSIS — Z98.89 OTHER SPECIFIED POSTPROCEDURAL STATES: Chronic | ICD-10-CM

## 2024-10-28 PROCEDURE — 73140 X-RAY EXAM OF FINGER(S): CPT | Mod: 26,LT,76

## 2024-10-28 PROCEDURE — 99285 EMERGENCY DEPT VISIT HI MDM: CPT

## 2024-10-28 RX ORDER — BUPIVACAINE HYDROCHLORIDE 5 MG/ML
5 INJECTION EPIDURAL; INTRACAUDAL; PERINEURAL ONCE
Refills: 0 | Status: COMPLETED | OUTPATIENT
Start: 2024-10-28 | End: 2024-10-28

## 2024-10-28 RX ORDER — LIDOCAINE HCL 20 MG/ML
5 AMPUL (ML) INJECTION ONCE
Refills: 0 | Status: COMPLETED | OUTPATIENT
Start: 2024-10-28 | End: 2024-10-28

## 2024-10-28 RX ORDER — ACETAMINOPHEN 325 MG
650 TABLET ORAL ONCE
Refills: 0 | Status: COMPLETED | OUTPATIENT
Start: 2024-10-28 | End: 2024-10-28

## 2024-10-28 RX ADMIN — BUPIVACAINE HYDROCHLORIDE 5 MILLILITER(S): 5 INJECTION EPIDURAL; INTRACAUDAL; PERINEURAL at 12:37

## 2024-10-28 RX ADMIN — Medication 5 MILLILITER(S): at 12:37

## 2024-10-28 RX ADMIN — Medication 650 MILLIGRAM(S): at 11:52

## 2024-10-28 RX ADMIN — Medication 650 MILLIGRAM(S): at 12:22

## 2024-10-28 NOTE — ED ADULT NURSE REASSESSMENT NOTE - NS ED NURSE REASSESS COMMENT FT1
I called pt to set up a referral appt with Mami Carpenter. There was no answer, I left  for pt to call back to schedule that appt.       10/6/20 Pt /67, asymptomatic. Pt denies CP, headache, palpitations, or finger pain. MD Izquierdo made aware of BP. Plan to be d/c. Pt to take home BP medications.

## 2024-10-28 NOTE — ED ADULT NURSE NOTE - OBJECTIVE STATEMENT
Pt from home with c/o left 3rd digit pain and swelling since Thursday. Pt denies any injury or trauma to the finger. Pt was seen in the ED yesterday for the same complaint but left due to prolonged wait time. Pt stating that he has come back to see the hand specialist. c/o 9/10 pain- took Tylenol PTA.    Pt presents from home with c/o left third digit pain and swelling x4 days. Pt denies any trauma or injury to his finger. Was in the ED yesterday for the same complaint but left due to prolonged wait time. States he was pending an ortho eval in the hospital yesterday. Pt denies taking tylenol at home prior to arrival to the ED, but reports taking all of his normal prescribed medications. Safety precautions maintained, call bell within reach. Pt presents from home with c/o left third digit pain and swelling x4 days. Pt denies any trauma or injury to his finger. Was in the ED yesterday for the same complaint but left due to prolonged wait time. States he was pending an ortho eval in the hospital yesterday. Pt denies taking tylenol at home prior to arrival to the ED, but reports taking all of his normal prescribed medications. Safety precautions maintained, call bell within reach.

## 2024-10-28 NOTE — ED PROVIDER NOTE - PATIENT PORTAL LINK FT
You can access the FollowMyHealth Patient Portal offered by [facility] by registering at the following website: [email or web address]. By joining [facility]’s FollowMyHealth portal, you will also be able to view your health information using other applications (apps) compatible with our system.

## 2024-10-28 NOTE — CHART NOTE - NSCHARTNOTEFT_GEN_A_CORE
Patient is a 89y old  Male who presents with a chief complaint of pain and swelling left middle finger for several days as per chart.  SW met with patient and grandson at bedside to assess for safety at home and assist with follow up appointment. The patient is alert, oriented and resides with spouse.  He ambulates without the use of any DME.  The patient has difficulty hearing.  The patient's grandson stated that a family takes the patient to medical appointments.  The patient is a [place] and [facility]. Patient is a 89y old  Male who presents with a chief complaint of pain and swelling left middle finger for several days as per chart.  SW met with patient and grandson at bedside to assess for safety at home and assist with follow up appointment. The patient is alert, oriented and resides with spouse.  He ambulates without the use of any DME.  The patient has difficulty hearing.  The patient's grandson stated that a family takes the patient to medical appointments.  The patient is a [place] and access [facility].  ED Provider recommended orthopedic follow up. Dr. Lester gave the patient's grandson contact number for Dr. Manuel Alaniz to call for a follow up appointment.
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
## 2024-10-28 NOTE — ED PROVIDER NOTE - OBJECTIVE STATEMENT
CC:  Patient is a 89y old  Male who presents with a chief complaint of pain and swelling left middle finger for several days    HPI:  pt denies h/o trauma, was in ED yesterday, but left before treatment and returns today with grandson

## 2024-10-28 NOTE — ED PROVIDER NOTE - PHYSICAL EXAMINATION
VITAL SIGNS: I have reviewed nursing notes and confirm.  CONSTITUTIONAL: well-appearing, non-toxic, NAD  SKIN: Warm dry, normal skin turgor  HEAD: NCAT  EXT: left middle finger swelling, limited ROM, tenderness to PIP joint

## 2024-10-28 NOTE — ED ADULT TRIAGE NOTE - CHIEF COMPLAINT QUOTE
Pt from home with c/o left 3rd digit pain and swelling since Thursday. Pt denies any injury or trauma to the finger. Pt was seen in the ED yesterday for the same complaint but left due to prolonged wait time. Pt stating that he has come back to see the hand specialist. c/o 9/10 pain- took Tylenol PTA.

## 2024-10-28 NOTE — CONSULT NOTE ADULT - ASSESSMENT
unstable fracture/dislocation left MF PIPjoint.  -refer to ortho hand (Dr. Aalniz)to consider primary fusion  -elevation  -cont splint
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
## 2024-10-31 DIAGNOSIS — M79.645 PAIN IN LEFT FINGER(S): ICD-10-CM

## 2024-10-31 DIAGNOSIS — F17.200 NICOTINE DEPENDENCE, UNSPECIFIED, UNCOMPLICATED: ICD-10-CM
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
## 2024-10-31 DIAGNOSIS — S62.623A DISPLACED FRACTURE OF MIDDLE PHALANX OF LEFT MIDDLE FINGER, INITIAL ENCOUNTER FOR CLOSED FRACTURE: ICD-10-CM

## 2024-11-07 ENCOUNTER — APPOINTMENT (OUTPATIENT)
Dept: ELECTROPHYSIOLOGY | Facility: CLINIC | Age: 89
End: 2024-11-07

## 2024-11-07 NOTE — PATIENT PROFILE ADULT - DO YOU FEEL LIKE HURTING YOURSELF OR OTHERS?
no Detail Level: Detailed Quality 47: Advance Care Plan: Advance Care Planning discussed and documented; advance care plan or surrogate decision maker documented in the medical record. Quality 226: Preventive Care And Screening: Tobacco Use: Screening And Cessation Intervention: Patient screened for tobacco use and is an ex/non-smoker

## 2024-11-19 ENCOUNTER — NON-APPOINTMENT (OUTPATIENT)
Age: 89
End: 2024-11-19

## 2024-11-19 ENCOUNTER — APPOINTMENT (OUTPATIENT)
Dept: ELECTROPHYSIOLOGY | Facility: CLINIC | Age: 89
End: 2024-11-19

## 2024-11-19 PROCEDURE — 93296 REM INTERROG EVL PM/IDS: CPT

## 2024-11-19 PROCEDURE — 93294 REM INTERROG EVL PM/LDLS PM: CPT

## 2024-11-19 NOTE — PROGRESS NOTE ADULT - PROBLEM/PLAN-2
Addended by: FLY FRANCO on: 11/19/2024 02:12 PM     Modules accepted: Orders    
DISPLAY PLAN FREE TEXT
DISPLAY PLAN FREE TEXT

## 2024-11-20 PROCEDURE — 73140 X-RAY EXAM OF FINGER(S): CPT

## 2024-11-20 PROCEDURE — 73130 X-RAY EXAM OF HAND: CPT

## 2024-11-20 PROCEDURE — 99284 EMERGENCY DEPT VISIT MOD MDM: CPT

## 2025-02-18 ENCOUNTER — NON-APPOINTMENT (OUTPATIENT)
Age: 89
End: 2025-02-18

## 2025-02-18 ENCOUNTER — APPOINTMENT (OUTPATIENT)
Dept: ELECTROPHYSIOLOGY | Facility: CLINIC | Age: 89
End: 2025-02-18
Payer: MEDICARE

## 2025-02-18 PROCEDURE — 93296 REM INTERROG EVL PM/IDS: CPT

## 2025-02-18 PROCEDURE — 93294 REM INTERROG EVL PM/LDLS PM: CPT

## 2025-04-01 ENCOUNTER — APPOINTMENT (OUTPATIENT)
Dept: CARDIOLOGY | Facility: CLINIC | Age: 89
End: 2025-04-01
Payer: MEDICARE

## 2025-04-01 ENCOUNTER — NON-APPOINTMENT (OUTPATIENT)
Age: 89
End: 2025-04-01

## 2025-04-01 VITALS
HEIGHT: 68 IN | OXYGEN SATURATION: 96 % | WEIGHT: 143 LBS | DIASTOLIC BLOOD PRESSURE: 64 MMHG | BODY MASS INDEX: 21.67 KG/M2 | SYSTOLIC BLOOD PRESSURE: 157 MMHG | RESPIRATION RATE: 19 BRPM | HEART RATE: 81 BPM

## 2025-04-01 DIAGNOSIS — I35.0 NONRHEUMATIC AORTIC (VALVE) STENOSIS: ICD-10-CM

## 2025-04-01 DIAGNOSIS — I10 ESSENTIAL (PRIMARY) HYPERTENSION: ICD-10-CM

## 2025-04-01 DIAGNOSIS — R94.31 ABNORMAL ELECTROCARDIOGRAM [ECG] [EKG]: ICD-10-CM

## 2025-04-01 DIAGNOSIS — Z95.3 PRESENCE OF XENOGENIC HEART VALVE: ICD-10-CM

## 2025-04-01 DIAGNOSIS — Z95.0 PRESENCE OF CARDIAC PACEMAKER: ICD-10-CM

## 2025-04-01 PROCEDURE — G2211 COMPLEX E/M VISIT ADD ON: CPT

## 2025-04-01 PROCEDURE — 99215 OFFICE O/P EST HI 40 MIN: CPT

## 2025-04-01 PROCEDURE — 93246 EXT ECG>7D<15D RECORDING: CPT

## 2025-04-01 PROCEDURE — 93000 ELECTROCARDIOGRAM COMPLETE: CPT | Mod: 59

## 2025-04-29 ENCOUNTER — APPOINTMENT (OUTPATIENT)
Dept: CARDIOLOGY | Facility: CLINIC | Age: 89
End: 2025-04-29
Payer: MEDICARE

## 2025-04-29 ENCOUNTER — NON-APPOINTMENT (OUTPATIENT)
Age: 89
End: 2025-04-29

## 2025-04-29 VITALS
DIASTOLIC BLOOD PRESSURE: 87 MMHG | OXYGEN SATURATION: 97 % | HEART RATE: 97 BPM | SYSTOLIC BLOOD PRESSURE: 180 MMHG | HEIGHT: 68 IN | BODY MASS INDEX: 21.67 KG/M2 | WEIGHT: 143 LBS | RESPIRATION RATE: 18 BRPM

## 2025-04-29 DIAGNOSIS — I35.0 NONRHEUMATIC AORTIC (VALVE) STENOSIS: ICD-10-CM

## 2025-04-29 DIAGNOSIS — Z95.0 PRESENCE OF CARDIAC PACEMAKER: ICD-10-CM

## 2025-04-29 DIAGNOSIS — Z95.3 PRESENCE OF XENOGENIC HEART VALVE: ICD-10-CM

## 2025-04-29 DIAGNOSIS — I10 ESSENTIAL (PRIMARY) HYPERTENSION: ICD-10-CM

## 2025-04-29 DIAGNOSIS — I48.91 UNSPECIFIED ATRIAL FIBRILLATION: ICD-10-CM

## 2025-04-29 PROCEDURE — 93000 ELECTROCARDIOGRAM COMPLETE: CPT

## 2025-04-29 PROCEDURE — G2211 COMPLEX E/M VISIT ADD ON: CPT

## 2025-04-29 PROCEDURE — 99215 OFFICE O/P EST HI 40 MIN: CPT

## 2025-04-29 PROCEDURE — 93306 TTE W/DOPPLER COMPLETE: CPT

## 2025-04-29 RX ORDER — METOPROLOL SUCCINATE 25 MG/1
25 TABLET, EXTENDED RELEASE ORAL DAILY
Qty: 1 | Refills: 0 | Status: ACTIVE | COMMUNITY
Start: 2025-04-29 | End: 1900-01-01

## 2025-04-29 RX ORDER — COLCHICINE 0.6 MG/1
0.6 CAPSULE ORAL
Qty: 90 | Refills: 1 | Status: ACTIVE | COMMUNITY
Start: 2025-04-29 | End: 1900-01-01

## 2025-04-30 PROCEDURE — 93248 EXT ECG>7D<15D REV&INTERPJ: CPT

## 2025-05-01 ENCOUNTER — OUTPATIENT (OUTPATIENT)
Dept: OUTPATIENT SERVICES | Facility: HOSPITAL | Age: 89
LOS: 1 days | End: 2025-05-01
Payer: MEDICARE

## 2025-05-01 ENCOUNTER — APPOINTMENT (OUTPATIENT)
Dept: CT IMAGING | Facility: HOSPITAL | Age: 89
End: 2025-05-01

## 2025-05-01 DIAGNOSIS — I31.39 OTHER PERICARDIAL EFFUSION (NONINFLAMMATORY): ICD-10-CM

## 2025-05-01 DIAGNOSIS — Z98.89 OTHER SPECIFIED POSTPROCEDURAL STATES: Chronic | ICD-10-CM

## 2025-05-01 PROCEDURE — 71250 CT THORAX DX C-: CPT

## 2025-05-01 PROCEDURE — 71250 CT THORAX DX C-: CPT | Mod: 26

## 2025-05-03 PROBLEM — I31.39 PERICARDIAL EFFUSION: Status: ACTIVE | Noted: 2025-04-29

## 2025-05-05 ENCOUNTER — APPOINTMENT (OUTPATIENT)
Dept: THORACIC SURGERY | Facility: CLINIC | Age: 89
End: 2025-05-05
Payer: MEDICARE

## 2025-05-05 VITALS
RESPIRATION RATE: 16 BRPM | HEART RATE: 84 BPM | HEIGHT: 66 IN | DIASTOLIC BLOOD PRESSURE: 82 MMHG | BODY MASS INDEX: 26.52 KG/M2 | SYSTOLIC BLOOD PRESSURE: 165 MMHG | WEIGHT: 165 LBS | OXYGEN SATURATION: 100 %

## 2025-05-05 DIAGNOSIS — C34.31 MALIGNANT NEOPLASM OF LOWER LOBE, RIGHT BRONCHUS OR LUNG: ICD-10-CM

## 2025-05-05 DIAGNOSIS — C34.32 MALIGNANT NEOPLASM OF LOWER LOBE, LEFT BRONCHUS OR LUNG: ICD-10-CM

## 2025-05-05 DIAGNOSIS — I31.39 OTHER PERICARDIAL EFFUSION (NONINFLAMMATORY): ICD-10-CM

## 2025-05-05 PROCEDURE — 99214 OFFICE O/P EST MOD 30 MIN: CPT

## 2025-05-11 ENCOUNTER — INPATIENT (INPATIENT)
Facility: HOSPITAL | Age: 89
LOS: 7 days | Discharge: HOME | End: 2025-05-19
Attending: THORACIC SURGERY (CARDIOTHORACIC VASCULAR SURGERY) | Admitting: THORACIC SURGERY (CARDIOTHORACIC VASCULAR SURGERY)
Payer: MEDICARE

## 2025-05-11 VITALS
TEMPERATURE: 98 F | OXYGEN SATURATION: 98 % | HEIGHT: 65 IN | DIASTOLIC BLOOD PRESSURE: 75 MMHG | SYSTOLIC BLOOD PRESSURE: 160 MMHG | RESPIRATION RATE: 18 BRPM | WEIGHT: 160.06 LBS | HEART RATE: 70 BPM

## 2025-05-11 DIAGNOSIS — Z95.0 PRESENCE OF CARDIAC PACEMAKER: Chronic | ICD-10-CM

## 2025-05-11 DIAGNOSIS — Z95.2 PRESENCE OF PROSTHETIC HEART VALVE: Chronic | ICD-10-CM

## 2025-05-11 DIAGNOSIS — I30.9 ACUTE PERICARDITIS, UNSPECIFIED: ICD-10-CM

## 2025-05-11 DIAGNOSIS — Z98.89 OTHER SPECIFIED POSTPROCEDURAL STATES: Chronic | ICD-10-CM

## 2025-05-11 LAB
ALBUMIN SERPL ELPH-MCNC: 4 G/DL — SIGNIFICANT CHANGE UP (ref 3.3–5)
ALP SERPL-CCNC: 87 U/L — SIGNIFICANT CHANGE UP (ref 40–120)
ALT FLD-CCNC: 10 U/L — SIGNIFICANT CHANGE UP (ref 4–41)
ANION GAP SERPL CALC-SCNC: 14 MMOL/L — SIGNIFICANT CHANGE UP (ref 7–14)
APTT BLD: 27.9 SEC — SIGNIFICANT CHANGE UP (ref 26.1–36.8)
AST SERPL-CCNC: 18 U/L — SIGNIFICANT CHANGE UP (ref 4–40)
BILIRUB SERPL-MCNC: 0.4 MG/DL — SIGNIFICANT CHANGE UP (ref 0.2–1.2)
BLD GP AB SCN SERPL QL: NEGATIVE — SIGNIFICANT CHANGE UP
BUN SERPL-MCNC: 40 MG/DL — HIGH (ref 7–23)
CALCIUM SERPL-MCNC: 9.4 MG/DL — SIGNIFICANT CHANGE UP (ref 8.4–10.5)
CHLORIDE SERPL-SCNC: 104 MMOL/L — SIGNIFICANT CHANGE UP (ref 98–107)
CO2 SERPL-SCNC: 17 MMOL/L — LOW (ref 22–31)
CREAT SERPL-MCNC: 1.73 MG/DL — HIGH (ref 0.5–1.3)
EGFR: 36 ML/MIN/1.73M2 — LOW
EGFR: 36 ML/MIN/1.73M2 — LOW
GLUCOSE SERPL-MCNC: 100 MG/DL — HIGH (ref 70–99)
HCT VFR BLD CALC: 35.2 % — LOW (ref 39–50)
HGB BLD-MCNC: 12.4 G/DL — LOW (ref 13–17)
INR BLD: 1.01 RATIO — SIGNIFICANT CHANGE UP (ref 0.85–1.16)
MCHC RBC-ENTMCNC: 35.2 G/DL — SIGNIFICANT CHANGE UP (ref 32–36)
MCHC RBC-ENTMCNC: 35.3 PG — HIGH (ref 27–34)
MCV RBC AUTO: 100.3 FL — HIGH (ref 80–100)
NRBC # BLD AUTO: 0 K/UL — SIGNIFICANT CHANGE UP (ref 0–0)
NRBC # FLD: 0 K/UL — SIGNIFICANT CHANGE UP (ref 0–0)
NRBC BLD AUTO-RTO: 0 /100 WBCS — SIGNIFICANT CHANGE UP (ref 0–0)
PLATELET # BLD AUTO: 168 K/UL — SIGNIFICANT CHANGE UP (ref 150–400)
POTASSIUM SERPL-MCNC: 4.1 MMOL/L — SIGNIFICANT CHANGE UP (ref 3.5–5.3)
POTASSIUM SERPL-SCNC: 4.1 MMOL/L — SIGNIFICANT CHANGE UP (ref 3.5–5.3)
PROT SERPL-MCNC: 6.6 G/DL — SIGNIFICANT CHANGE UP (ref 6–8.3)
PROTHROM AB SERPL-ACNC: 11.7 SEC — SIGNIFICANT CHANGE UP (ref 9.9–13.4)
RBC # BLD: 3.51 M/UL — LOW (ref 4.2–5.8)
RBC # FLD: 14.2 % — SIGNIFICANT CHANGE UP (ref 10.3–14.5)
RH IG SCN BLD-IMP: POSITIVE — SIGNIFICANT CHANGE UP
SODIUM SERPL-SCNC: 135 MMOL/L — SIGNIFICANT CHANGE UP (ref 135–145)
WBC # BLD: 6.18 K/UL — SIGNIFICANT CHANGE UP (ref 3.8–10.5)
WBC # FLD AUTO: 6.18 K/UL — SIGNIFICANT CHANGE UP (ref 3.8–10.5)

## 2025-05-11 PROCEDURE — 99223 1ST HOSP IP/OBS HIGH 75: CPT

## 2025-05-11 RX ORDER — CYCLOSPORINE OPHTHALMIC SOLUTION 1 MG/ML
1 SOLUTION/ DROPS OPHTHALMIC EVERY 12 HOURS
Refills: 0 | Status: DISCONTINUED | OUTPATIENT
Start: 2025-05-11 | End: 2025-05-19

## 2025-05-11 RX ORDER — CYCLOSPORINE OPHTHALMIC SOLUTION 1 MG/ML
1 SOLUTION/ DROPS OPHTHALMIC
Refills: 0 | DISCHARGE

## 2025-05-11 RX ORDER — LEVOTHYROXINE SODIUM 300 MCG
150 TABLET ORAL DAILY
Refills: 0 | Status: DISCONTINUED | OUTPATIENT
Start: 2025-05-12 | End: 2025-05-19

## 2025-05-11 RX ORDER — LANOLIN/MINERAL OIL/PETROLATUM
1 OINTMENT (GRAM) OPHTHALMIC (EYE)
Refills: 0 | DISCHARGE

## 2025-05-11 RX ORDER — COLCHICINE 0.6 MG/1
0.6 TABLET, FILM COATED ORAL DAILY
Refills: 0 | Status: DISCONTINUED | OUTPATIENT
Start: 2025-05-12 | End: 2025-05-19

## 2025-05-11 RX ORDER — LOSARTAN POTASSIUM 100 MG/1
100 TABLET, FILM COATED ORAL DAILY
Refills: 0 | Status: DISCONTINUED | OUTPATIENT
Start: 2025-05-12 | End: 2025-05-19

## 2025-05-11 RX ORDER — METOPROLOL SUCCINATE 50 MG/1
25 TABLET, EXTENDED RELEASE ORAL DAILY
Refills: 0 | Status: DISCONTINUED | OUTPATIENT
Start: 2025-05-12 | End: 2025-05-19

## 2025-05-11 RX ORDER — CALCIUM CARBONATE/VITAMIN D3 500MG-5MCG
1 TABLET ORAL DAILY
Refills: 0 | Status: DISCONTINUED | OUTPATIENT
Start: 2025-05-12 | End: 2025-05-19

## 2025-05-11 RX ORDER — ATORVASTATIN CALCIUM 80 MG/1
10 TABLET, FILM COATED ORAL AT BEDTIME
Refills: 0 | Status: DISCONTINUED | OUTPATIENT
Start: 2025-05-11 | End: 2025-05-19

## 2025-05-11 RX ORDER — LANOLIN/MINERAL OIL/PETROLATUM
1 OINTMENT (GRAM) OPHTHALMIC (EYE)
Refills: 0 | Status: DISCONTINUED | OUTPATIENT
Start: 2025-05-11 | End: 2025-05-19

## 2025-05-11 RX ADMIN — ATORVASTATIN CALCIUM 10 MILLIGRAM(S): 80 TABLET, FILM COATED ORAL at 21:15

## 2025-05-11 RX ADMIN — Medication 25 MILLIGRAM(S): at 21:16

## 2025-05-11 RX ADMIN — CYCLOSPORINE OPHTHALMIC SOLUTION 1 DROP(S): 1 SOLUTION/ DROPS OPHTHALMIC at 21:16

## 2025-05-11 NOTE — H&P ADULT - NSHPREVIEWOFSYSTEMS_GEN_ALL_CORE
General: No Weight change, Fatigue, HA, or Dizziness	  Skin/Breast: No Rashes, Lesions, or Masses	  Ophthalmologic: No Blurry vision 	  ENMT: + [place] for which hearing aides are used, no sore throat	  Respiratory and Thorax: No Cough, Wheezing, SOB, Hemoptysis, Sputum production, Pleuritic CP	  Cardiovascular: No SS Chest pain, Palpitations, or Diaphoresis	  Gastrointestinal: No Nausea, Vomiting, Constipation, or Appetite Change	  Genitourinary: No Hematuria, Dysuria, Frequency change	  Musculoskeletal: No Pain, Weakness, or Claudication	  Neurological: No Seizures, TIA/ CVA, or  Paresthesias	  Psychiatric: No Dementia or Depression	  Hematology/Lymphatics: No hx of bleeding or Edema	  Endocrine: No Hyperglycemia or  Hypoglycemia  Allergic/Immunologic: No Anaphylaxis, Intolerance, or  Recent illnesses

## 2025-05-11 NOTE — H&P ADULT - NSHPPHYSICALEXAM_GEN_ALL_CORE
Vital Signs Last 24 Hrs  T(C): 36.4 (11 May 2025 21:05), Max: 36.4 (11 May 2025 18:26)  T(F): 97.5 (11 May 2025 21:05), Max: 97.6 (11 May 2025 18:26)  HR: 80 (11 May 2025 21:05) (70 - 80)  BP: 171/92 (11 May 2025 21:05) (160/75 - 171/92)  RR: 19 (11 May 2025 21:05) (18 - 19)  SpO2: 99% (11 May 2025 21:05) (98% - 99%) RA    General: WN/WD NAD  Neurology: A&Ox3, nonfocal, CATHERINE x 4  Eyes: Dry eyes; Gross vision intact  ENT/Neck: Neck supple, trachea midline, No JVD, Gross hearing intact  Respiratory: CTA B/L, No wheezing, rales, rhonchi  CV: RRR, S1S2  Abdominal: Soft, NT, ND  Extremities: No edema, + peripheral pulses  Skin: No Rashes, Hematoma, Ecchymosis

## 2025-05-11 NOTE — PATIENT PROFILE ADULT - NSPROIMPLANTSMEDDEV_GEN_A_NUR
The patient has been examined and the H&P has been reviewed:    I concur with the findings and no changes have occurred since H&P was written.    Surgery risks, benefits and alternative options discussed and understood by patient/family.          There are no hospital problems to display for this patient.     pacemaker

## 2025-05-11 NOTE — H&P ADULT - NSICDXPASTMEDICALHX_GEN_ALL_CORE_FT
PAST MEDICAL HISTORY:  Aortic stenosis severe per ECHO done 7/2020    Asbestos exposure     Bilateral cataracts     BPH (benign prostatic hyperplasia)     Bradycardia     Bronchitis     Chronic gout     Crush injury, thigh     Dry eyes     GERD (gastroesophageal reflux disease)     Glaucoma     Hyperlipidemia     Hypertension     Hypothyroid     Lung nodule     Osteoarthritis     Peptic ulcer     Pericardial effusion     Rib fracture     Sciatica     Spinal stenosis, lumbar     Third degree AV block     Vitamin D deficiency      PAST MEDICAL HISTORY:  Aortic stenosis severe per ECHO done 7/2020    Asbestos exposure     Bilateral cataracts     BPH (benign prostatic hyperplasia)     Bradycardia     Bronchitis     Chronic gout     Crush injury, thigh     Dry eyes     GERD (gastroesophageal reflux disease)     Glaucoma     Hyperlipidemia     Hypertension     Hypothyroid     Lung cancer     Osteoarthritis     Peptic ulcer     Pericardial effusion     Rib fracture     Sciatica     Spinal stenosis, lumbar     Third degree AV block     Vitamin D deficiency

## 2025-05-11 NOTE — H&P ADULT - NSHPLABSRESULTS_GEN_ALL_CORE
CBC, PT/ INR. PTT, BMP, T &S all done and resulted.      CT Chest No Cont (05.01.25 @ 11:37) >  IMPRESSION:  Trace right and small left pleural effusions with associated atelectasis  Dilated main pulmonary artery, which can besuggestive of pulmonary hypertension.  Moderate pericardial effusion with interval increase in size  Interval increase in hepatic cysts located in the caudate lobe now measuring 4.0 x 4.3 cm, previously measuring 2.6 x 3.0 cm.  Punctuate calcification seen in the body of the pancreas, new when compared to prior exam CBC, PT/ INR. PTT, BMP, T &S all done and resulted.      CT Chest No Cont (05.01.25 @ 11:37) >  IMPRESSION:  Trace right and small left pleural effusions with associated atelectasis  Dilated main pulmonary artery, which can be suggestive of pulmonary hypertension.  Moderate pericardial effusion with interval increase in size  Interval increase in hepatic cysts located in the caudate lobe now measuring 4.0 x 4.3 cm, previously measuring 2.6 x 3.0 cm.  Punctuate calcification seen in the body of the pancreas, new when compared to prior exam

## 2025-05-11 NOTE — H&P ADULT - ASSESSMENT
Assessment:  Pericardial effusion    Plan:  IR pericardial effusion drainage tomorrow  NPO p MN  No AC  Repeat labs in AM

## 2025-05-11 NOTE — CONSULT NOTE ADULT - ASSESSMENT
Interventional Radiology    Evaluate for Procedure:     HPI: 89 yo M s/p Left VATS, LLL wedge resection on 7/9/2015 for Stage IA (pT1b pN0 pMx) adenocarcinoma, and s/p Right VATS, RLL wedge resection on 1/30/2014 for Stage IA (pT1a pN0 pMx) adenocarcinoma presents with pericardial idenitified on outpatient imaging for pericardial drainage. IR c/s now that patient admitted.    Allergies: No Known Allergies    Medications (Abx/Cardiac/Anticoagulation/Blood Products)      Data:  165.1  72.6  T(C): 36.4  HR: 70  BP: 160/75  RR: 18  SpO2: 98%    -WBC 6.18 / HgB 12.4 / Hct 35.2 / Plt 168  -Na 135 / Cl 104 / BUN 40 / Glucose 100  -K 4.1 / CO2 17 / Cr 1.73  -ALT 10 / Alk Phos 87 / T.Bili 0.4  -INR 1.01 / PTT 27.9      Radiology:     Assessment/Plan:   89 yo M s/p Left VATS, LLL wedge resection on 7/9/2015 for Stage IA (pT1b pN0 pMx) adenocarcinoma, and s/p Right VATS, RLL wedge resection on 1/30/2014 for Stage IA (pT1a pN0 pMx) adenocarcinoma presents with pericardial idenitified on outpatient imaging for pericardial drainage. IR c/s now that patient admitted.    -- Case discussed between Dr. Rai and Dr. Rascon  -- IR will plan to perform 5/12  -- NPO after midnight 5/12  -- Patient not currently on any AC inpatient and reportedly not on AC at home.  -- AM CBC, BMP, Coags 5/12  -- Please place IR procedure order under Dr. Rascon  -- Please write IR pre-procedure note    Monico Hernandez M.D.  PGY5/R4, Interventional Radiology Senior Resident    -Available on [employer] TEAMS for all non-urgent questions  -Emergent issues: [facility]-p.[phone]; [facility]-p.[phone] ([phone])  -Non-emergent consults: Please place a [place] order "Consult-Interventional Radiology" with an appropriate callback number  -Scheduling questions: [facility]: [phone]; [facility]: [phone]  -Clinic/Outpatient booking: [facility]: [phone]; [facility]: [phone]
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
## 2025-05-11 NOTE — H&P ADULT - HISTORY OF PRESENT ILLNESS
CC; Pericardial effusion    HPI: This 89 year old male had a routine follow up with his cardiologist in 4/2025 and was found to be in atrial fibrillation.  An echocardiogram was done to evaluate the heart and it showed a moderate pericardial effusion that had increased in size compared with an echocardiogram from the prior year.  Colchicine was was started to try to reduce the size of the effusion due to its aniinflammatory effects.  However a CT of the chest on 5/1/25 still showed a moderate pericardial effusion.  The pt denies SOB or YADAV.

## 2025-05-12 LAB
ANION GAP SERPL CALC-SCNC: 14 MMOL/L — SIGNIFICANT CHANGE UP (ref 7–14)
APTT BLD: 28.7 SEC — SIGNIFICANT CHANGE UP (ref 26.1–36.8)
BUN SERPL-MCNC: 41 MG/DL — HIGH (ref 7–23)
CALCIUM SERPL-MCNC: 9.3 MG/DL — SIGNIFICANT CHANGE UP (ref 8.4–10.5)
CHLORIDE SERPL-SCNC: 104 MMOL/L — SIGNIFICANT CHANGE UP (ref 98–107)
CO2 SERPL-SCNC: 19 MMOL/L — LOW (ref 22–31)
CREAT SERPL-MCNC: 1.72 MG/DL — HIGH (ref 0.5–1.3)
EGFR: 37 ML/MIN/1.73M2 — LOW
EGFR: 37 ML/MIN/1.73M2 — LOW
GLUCOSE SERPL-MCNC: 98 MG/DL — SIGNIFICANT CHANGE UP (ref 70–99)
HCT VFR BLD CALC: 33.6 % — LOW (ref 39–50)
HGB BLD-MCNC: 11.8 G/DL — LOW (ref 13–17)
INR BLD: 1.08 RATIO — SIGNIFICANT CHANGE UP (ref 0.85–1.16)
MCHC RBC-ENTMCNC: 34.6 PG — HIGH (ref 27–34)
MCHC RBC-ENTMCNC: 35.1 G/DL — SIGNIFICANT CHANGE UP (ref 32–36)
MCV RBC AUTO: 98.5 FL — SIGNIFICANT CHANGE UP (ref 80–100)
NRBC # BLD AUTO: 0 K/UL — SIGNIFICANT CHANGE UP (ref 0–0)
NRBC # FLD: 0 K/UL — SIGNIFICANT CHANGE UP (ref 0–0)
NRBC BLD AUTO-RTO: 0 /100 WBCS — SIGNIFICANT CHANGE UP (ref 0–0)
PLATELET # BLD AUTO: 165 K/UL — SIGNIFICANT CHANGE UP (ref 150–400)
POTASSIUM SERPL-MCNC: 3.8 MMOL/L — SIGNIFICANT CHANGE UP (ref 3.5–5.3)
POTASSIUM SERPL-SCNC: 3.8 MMOL/L — SIGNIFICANT CHANGE UP (ref 3.5–5.3)
PROTHROM AB SERPL-ACNC: 12.5 SEC — SIGNIFICANT CHANGE UP (ref 9.9–13.4)
RBC # BLD: 3.41 M/UL — LOW (ref 4.2–5.8)
RBC # FLD: 13.7 % — SIGNIFICANT CHANGE UP (ref 10.3–14.5)
RH IG SCN BLD-IMP: POSITIVE — SIGNIFICANT CHANGE UP
SODIUM SERPL-SCNC: 137 MMOL/L — SIGNIFICANT CHANGE UP (ref 135–145)
WBC # BLD: 5.97 K/UL — SIGNIFICANT CHANGE UP (ref 3.8–10.5)
WBC # FLD AUTO: 5.97 K/UL — SIGNIFICANT CHANGE UP (ref 3.8–10.5)

## 2025-05-12 PROCEDURE — 99223 1ST HOSP IP/OBS HIGH 75: CPT

## 2025-05-12 PROCEDURE — 99232 SBSQ HOSP IP/OBS MODERATE 35: CPT

## 2025-05-12 RX ORDER — COLCHICINE 0.6 MG/1
1 TABLET, FILM COATED ORAL
Refills: 0 | DISCHARGE
Start: 2025-05-12

## 2025-05-12 RX ADMIN — Medication 1 DROP(S): at 05:09

## 2025-05-12 RX ADMIN — Medication 150 MICROGRAM(S): at 05:09

## 2025-05-12 RX ADMIN — CYCLOSPORINE OPHTHALMIC SOLUTION 1 DROP(S): 1 SOLUTION/ DROPS OPHTHALMIC at 05:09

## 2025-05-12 RX ADMIN — Medication 1 TABLET(S): at 11:51

## 2025-05-12 RX ADMIN — Medication 40 MILLIGRAM(S): at 05:08

## 2025-05-12 RX ADMIN — Medication 1 DROP(S): at 11:51

## 2025-05-12 RX ADMIN — ATORVASTATIN CALCIUM 10 MILLIGRAM(S): 80 TABLET, FILM COATED ORAL at 21:01

## 2025-05-12 RX ADMIN — LOSARTAN POTASSIUM 100 MILLIGRAM(S): 100 TABLET, FILM COATED ORAL at 05:08

## 2025-05-12 RX ADMIN — CYCLOSPORINE OPHTHALMIC SOLUTION 1 DROP(S): 1 SOLUTION/ DROPS OPHTHALMIC at 18:30

## 2025-05-12 RX ADMIN — Medication 10 MILLIGRAM(S): at 12:15

## 2025-05-12 RX ADMIN — Medication 50 MILLIGRAM(S): at 18:33

## 2025-05-12 RX ADMIN — Medication 25 MILLIGRAM(S): at 05:08

## 2025-05-12 RX ADMIN — METOPROLOL SUCCINATE 25 MILLIGRAM(S): 50 TABLET, EXTENDED RELEASE ORAL at 05:08

## 2025-05-12 RX ADMIN — Medication 300 MILLIGRAM(S): at 11:50

## 2025-05-12 RX ADMIN — Medication 1 DROP(S): at 18:31

## 2025-05-12 RX ADMIN — COLCHICINE 0.6 MILLIGRAM(S): 0.6 TABLET, FILM COATED ORAL at 11:49

## 2025-05-12 NOTE — CHART NOTE - NSCHARTNOTEFT_GEN_A_CORE
Patient Age: 89  Patient Gender: Male  Procedure: Pericardial drainage  Diagnosis/Indication: Pericardial effusion  Interventional Radiology Attending Physician: Tarah  Ordering Attending Physician: Cecelia  Pertinent Medical History: Moderate pericardial effusion enlarged despite trial of Colchicine; Afib not on AC; PPM; h/o TAVR  Pertinent labs:  PT 11.7, INR 1.01, PTT 27.9         Patient and Family Aware ? Yes
Patient brought to CT for pericardial drainage. BP noted to be elevated, ranging from 200/150 to 185/115 mmHg. Pt denies CP or SOB, headache. Pt reports feeling anxious. Discussed above with Dr Rai. patient will need to be optimized with better BP control. Will plan for procedure for tomorrow.

## 2025-05-12 NOTE — PROVIDER CONTACT NOTE (OTHER) - ACTION/TREATMENT ORDERED:
Give hydralazine now and daily BP meds as scheduled. Give hydralazine 25mg PO now and daily BP meds as scheduled.

## 2025-05-12 NOTE — PROVIDER CONTACT NOTE (OTHER) - ASSESSMENT
Patient is a&ox4. Denies chest pain and shortness of breath. Pt sitting in chair Patient is a&ox4. Denies chest pain and shortness of breath. Pt sitting in chair. Blood pressure 180/90
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
## 2025-05-12 NOTE — CONSULT NOTE ADULT - ATTENDING COMMENTS
agree with above.  increase hydralazine, monitor closely on Tele  awaiting IR guided pericardiocentesis after BP control

## 2025-05-12 NOTE — PROVIDER CONTACT NOTE (OTHER) - SITUATION
Pt hypertensive 171/92
Patient is hypertensive pt is asymptomatic. Blood pressure taken manually
Pt hypertensive 160/108
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
## 2025-05-13 ENCOUNTER — RESULT REVIEW (OUTPATIENT)
Age: 89
End: 2025-05-13

## 2025-05-13 LAB
ALBUMIN FLD-MCNC: 3.3 G/DL — SIGNIFICANT CHANGE UP
ANION GAP SERPL CALC-SCNC: 14 MMOL/L — SIGNIFICANT CHANGE UP (ref 7–14)
APTT BLD: 27.8 SEC — SIGNIFICANT CHANGE UP (ref 26.1–36.8)
B PERT IGG+IGM PNL SER: ABNORMAL
BUN SERPL-MCNC: 40 MG/DL — HIGH (ref 7–23)
CALCIUM SERPL-MCNC: 9.5 MG/DL — SIGNIFICANT CHANGE UP (ref 8.4–10.5)
CHLORIDE SERPL-SCNC: 103 MMOL/L — SIGNIFICANT CHANGE UP (ref 98–107)
CO2 SERPL-SCNC: 19 MMOL/L — LOW (ref 22–31)
COLOR FLD: YELLOW
CREAT SERPL-MCNC: 1.92 MG/DL — HIGH (ref 0.5–1.3)
EGFR: 32 ML/MIN/1.73M2 — LOW
EGFR: 32 ML/MIN/1.73M2 — LOW
FLUID INTAKE SUBSTANCE CLASS: SIGNIFICANT CHANGE UP
GLUCOSE BLDC GLUCOMTR-MCNC: 119 MG/DL — HIGH (ref 70–99)
GLUCOSE FLD-MCNC: 105 MG/DL — SIGNIFICANT CHANGE UP
GLUCOSE SERPL-MCNC: 101 MG/DL — HIGH (ref 70–99)
GRAM STN FLD: SIGNIFICANT CHANGE UP
HCT VFR BLD CALC: 36.1 % — LOW (ref 39–50)
HGB BLD-MCNC: 12.6 G/DL — LOW (ref 13–17)
INR BLD: 1.04 RATIO — SIGNIFICANT CHANGE UP (ref 0.85–1.16)
LDH SERPL L TO P-CCNC: 185 U/L — SIGNIFICANT CHANGE UP
LYMPHOCYTES # FLD: 23 % — SIGNIFICANT CHANGE UP
MAGNESIUM SERPL-MCNC: 1.8 MG/DL — SIGNIFICANT CHANGE UP (ref 1.6–2.6)
MCHC RBC-ENTMCNC: 34.9 G/DL — SIGNIFICANT CHANGE UP (ref 32–36)
MCHC RBC-ENTMCNC: 34.9 PG — HIGH (ref 27–34)
MCV RBC AUTO: 100 FL — SIGNIFICANT CHANGE UP (ref 80–100)
MONOS+MACROS # FLD: 74 % — SIGNIFICANT CHANGE UP
NEUTROPHILS-BODY FLUID: 3 % — SIGNIFICANT CHANGE UP
NIGHT BLUE STAIN TISS: SIGNIFICANT CHANGE UP
NRBC # BLD AUTO: 0 K/UL — SIGNIFICANT CHANGE UP (ref 0–0)
NRBC # FLD: 0 K/UL — SIGNIFICANT CHANGE UP (ref 0–0)
NRBC BLD AUTO-RTO: 0 /100 WBCS — SIGNIFICANT CHANGE UP (ref 0–0)
PH FLD: 8 — SIGNIFICANT CHANGE UP
PHOSPHATE SERPL-MCNC: 3.1 MG/DL — SIGNIFICANT CHANGE UP (ref 2.5–4.5)
PLATELET # BLD AUTO: 178 K/UL — SIGNIFICANT CHANGE UP (ref 150–400)
POTASSIUM SERPL-MCNC: 3.9 MMOL/L — SIGNIFICANT CHANGE UP (ref 3.5–5.3)
POTASSIUM SERPL-SCNC: 3.9 MMOL/L — SIGNIFICANT CHANGE UP (ref 3.5–5.3)
PROT FLD-MCNC: 4.7 G/DL — SIGNIFICANT CHANGE UP
PROTHROM AB SERPL-ACNC: 12.4 SEC — SIGNIFICANT CHANGE UP (ref 9.9–13.4)
RBC # BLD: 3.61 M/UL — LOW (ref 4.2–5.8)
RBC # FLD: 14 % — SIGNIFICANT CHANGE UP (ref 10.3–14.5)
RCV VOL RI: 50 CELLS/UL — HIGH (ref 0–5)
SODIUM SERPL-SCNC: 136 MMOL/L — SIGNIFICANT CHANGE UP (ref 135–145)
SPECIMEN SOURCE FLD: SIGNIFICANT CHANGE UP
SPECIMEN SOURCE FLD: SIGNIFICANT CHANGE UP
SPECIMEN SOURCE: SIGNIFICANT CHANGE UP
SPECIMEN SOURCE: SIGNIFICANT CHANGE UP
TOTAL CELLS COUNTED, BODY FLUID: 100 CELLS — SIGNIFICANT CHANGE UP
TOTAL NUCLEATED CELL COUNT, BODY FLUID: 500 CELLS/UL — HIGH (ref 0–5)
TUBE TYPE: SIGNIFICANT CHANGE UP
WBC # BLD: 5.85 K/UL — SIGNIFICANT CHANGE UP (ref 3.8–10.5)
WBC # FLD AUTO: 5.85 K/UL — SIGNIFICANT CHANGE UP (ref 3.8–10.5)

## 2025-05-13 PROCEDURE — 88112 CYTOPATH CELL ENHANCE TECH: CPT | Mod: 26

## 2025-05-13 PROCEDURE — 99232 SBSQ HOSP IP/OBS MODERATE 35: CPT

## 2025-05-13 PROCEDURE — 33019 PERQ PRCRD DRG INSJ CATH CT: CPT

## 2025-05-13 PROCEDURE — 99233 SBSQ HOSP IP/OBS HIGH 50: CPT

## 2025-05-13 PROCEDURE — 88305 TISSUE EXAM BY PATHOLOGIST: CPT | Mod: 26

## 2025-05-13 RX ADMIN — Medication 1 DROP(S): at 21:30

## 2025-05-13 RX ADMIN — Medication 50 MILLIGRAM(S): at 17:10

## 2025-05-13 RX ADMIN — CYCLOSPORINE OPHTHALMIC SOLUTION 1 DROP(S): 1 SOLUTION/ DROPS OPHTHALMIC at 17:12

## 2025-05-13 RX ADMIN — Medication 1 TABLET(S): at 13:11

## 2025-05-13 RX ADMIN — Medication 1 DROP(S): at 13:11

## 2025-05-13 RX ADMIN — Medication 50 MILLIGRAM(S): at 05:39

## 2025-05-13 RX ADMIN — METOPROLOL SUCCINATE 25 MILLIGRAM(S): 50 TABLET, EXTENDED RELEASE ORAL at 05:39

## 2025-05-13 RX ADMIN — LOSARTAN POTASSIUM 100 MILLIGRAM(S): 100 TABLET, FILM COATED ORAL at 05:39

## 2025-05-13 RX ADMIN — Medication 25 MILLIGRAM(S): at 12:08

## 2025-05-13 RX ADMIN — Medication 150 MICROGRAM(S): at 05:39

## 2025-05-13 RX ADMIN — Medication 1 DROP(S): at 17:08

## 2025-05-13 RX ADMIN — ATORVASTATIN CALCIUM 10 MILLIGRAM(S): 80 TABLET, FILM COATED ORAL at 21:31

## 2025-05-13 RX ADMIN — Medication 300 MILLIGRAM(S): at 13:11

## 2025-05-13 RX ADMIN — COLCHICINE 0.6 MILLIGRAM(S): 0.6 TABLET, FILM COATED ORAL at 13:12

## 2025-05-13 RX ADMIN — CYCLOSPORINE OPHTHALMIC SOLUTION 1 DROP(S): 1 SOLUTION/ DROPS OPHTHALMIC at 05:38

## 2025-05-13 RX ADMIN — Medication 40 MILLIGRAM(S): at 05:39

## 2025-05-13 RX ADMIN — Medication 1 DROP(S): at 05:38

## 2025-05-13 NOTE — PROGRESS NOTE ADULT - SUBJECTIVE AND OBJECTIVE BOX
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 Drop(s) Both EYES four times a day  calcium carbonate 1250 mG  + Vitamin D (OsCal 500 + D) 1 Tablet(s) Oral daily  cycloSPORINE (RESTASIS) 0.05% Emulsion 1 Drop(s) Both EYES every 12 hours      REVIEW OF SYSTEMS:  CV: chest pain (-), palpitation (-), orthopnea (-), PND (-), edema (-)  PULM: SOB (-), cough (-), wheezing (-), hemoptysis (-).   CONST: fever (-), chills (-) or fatigue (-)  GI: abdominal distension (-), abdominal pain (-) , nausea/vomiting (-), hematemesis, (-), melena (-), hematochezia (-)  : dysuria (-), frequency (-), hematuria (-).   NEURO: numbness (-), weakness (-), dizziness (-)  SKIN: itching (-), rash (-)  HEENT:  visual changes (-); vertigo or throat pain (-);  neck stiffness (-)     All other review of systems is negative unless indicated above.   -------------------------------------------------------------------------------------------  PHYSICAL EXAM:  T(C): 36.7 (05-12-25 @ 10:44), Max: 36.7 (05-12-25 @ 08:40)  HR: 112 (05-12-25 @ 12:00) (70 - 112)  BP: 180/90 (05-12-25 @ 12:00) (160/75 - 180/90)  RR: 18 (05-12-25 @ 10:44) (18 - 19)  SpO2: 98% (05-12-25 @ 08:40) (97% - 99%)  Wt(kg): --  I&O's Summary    11 May 2025 07:01  -  12 May 2025 07:00  --------------------------------------------------------  IN: 290 mL / OUT: 803 mL / NET: -513 mL    12 May 2025 07:01  -  12 May 2025 13:42  --------------------------------------------------------  IN: 250 mL / OUT: 0 mL / NET: 250 mL        General: No acute distress. Awake and conversant.   Eyes: Normal conjunctiva, anicteric. Round symmetric pupils.   ENT: Hearing grossly intact. No nasal discharge.   Neck: Neck is supple. No masses or thyromegaly.   Respiratory: Respirations are non-labored. No wheezing.   Skin: Warm. No rashes or ulcers.   Psych: Alert and oriented. Cooperative, Appropriate mood and affect, Normal judgment.   CV: No lower extremity edema.   MSK: Normal ambulation. No clubbing or cyanosis.   Neuro: Sensation and CN II-XII grossly normal.      -------------------------------------------------------------------------------------------  LABS:  05-12    137  |  104  |  41[H]  ----------------------------<  98  3.8   |  19[L]  |  1.72[H]    Ca    9.3      12 May 2025 06:23    TPro  6.6  /  Alb  4.0  /  TBili  0.4  /  DBili  x   /  AST  18  /  ALT  10  /  AlkPhos  87  05-11    Creatinine Trend: 1.72<--, 1.73<--                        11.8   5.97  )-----------( 165      ( 12 May 2025 06:23 )             33.6     PT/INR - ( 12 May 2025 06:23 )   PT: 12.5 sec;   INR: 1.08 ratio         PTT - ( 12 May 2025 06:23 )  PTT:28.7 sec    Lipid Panel: hydrALAZINE 50 milliGRAM(s) Oral two times a day  losartan 100 milliGRAM(s) Oral daily  metoprolol succinate ER 25 milliGRAM(s) Oral daily    Cardiac Enzymes:         -------------------------------------------------------------------------------------------  Meds:  pantoprazole    Tablet 40 milliGRAM(s) Oral before breakfast    -------------------------------------------------------------------------------------------  Pt 89 YO M with Hx of HTN, Afib not on home AC, hypothyroidism, AS s/p TAVR, presented to the hospital after routine TTE showed moderate size pericardial effusion at the cardiology office, increased in size prior to the year prior. Pt was sent to IR for pericardial drain, however was noted to be hypertensive to 200/150. Procedure aborted thereafter. Pt asymptomatic without signs of end organ failure. Cardiology consulted for BP control.   ,  Problems Assessed:   1. Hypertensive urgency  2. Moderate pericardial effusion  3. Afib not on home AC      Plan:   - Pt asymptomatic now s/p IR pericardiocentesis  - Per [employer]scripts: pt on home hydralazine 25 BID, Losartan 100mg daily, Verapamil 120 daily  - Hydralazine 50 BID  - Losartan 100 daily   - metoprolol succinate 25 daily   - f/u pericardial effusion fluids studies   - pt will benefited from tightly BP control outpatient with goal 130/80  - cardiology to sign off, can DC pt with current inpatient BP meds         Freeman Herrera MD   of Cardiology  [facility] at [address], [address]  [place] [address]  Phone: [phone]  Fax: [phone]  -------------------------------------------------------------------------------------------

## 2025-05-13 NOTE — PROGRESS NOTE ADULT - SUBJECTIVE AND OBJECTIVE BOX
Subjective:  Patient seen and assessed at bedside with thoracic surgery team during AM rounds   No fevers/chills, SOB, chest pain, headaches, dizziness, N/V  Patient resting comfortably in chair  Increased Hydralazine 5/12, BP improved  Plan for IR placement of pericardial effusion drainage.      Vital Signs:  Vital Signs Last 24 Hrs  T(C): 36.7 (05-13-25 @ 08:48), Max: 36.7 (05-12-25 @ 10:44)  T(F): 98 (05-13-25 @ 08:48), Max: 98 (05-13-25 @ 08:30)  HR: 88 (05-13-25 @ 08:48) (80 - 112)  BP: 149/88 (05-13-25 @ 08:48) (144/85 - 180/90)  RR: 18 (05-13-25 @ 08:48) (18 - 18)  SpO2: 99% (05-13-25 @ 08:48) (96% - 100%) on (O2)    Telemetry/Alarms:  Relevant labs, radiology and Medications reviewed                        12.6   5.85  )-----------( 178      ( 13 May 2025 06:39 )             36.1     05-13    136  |  103  |  40[H]  ----------------------------<  101[H]  3.9   |  19[L]  |  1.92[H]    Ca    9.5      13 May 2025 06:39  Phos  3.1     05-13  Mg     1.80     05-13    TPro  6.6  /  Alb  4.0  /  TBili  0.4  /  DBili  x   /  AST  18  /  ALT  10  /  AlkPhos  87  05-11    PT/INR - ( 13 May 2025 06:39 )   PT: 12.4 sec;   INR: 1.04 ratio         PTT - ( 13 May 2025 06:39 )  PTT:27.8 sec  MEDICATIONS  (STANDING):  allopurinol 300 milliGRAM(s) Oral daily  artificial  tears Solution 1 Drop(s) Both EYES four times a day  atorvastatin 10 milliGRAM(s) Oral at bedtime  calcium carbonate 1250 mG  + Vitamin D (OsCal 500 + D) 1 Tablet(s) Oral daily  colchicine 0.6 milliGRAM(s) Oral daily  cycloSPORINE (RESTASIS) 0.05% Emulsion 1 Drop(s) Both EYES every 12 hours  hydrALAZINE 50 milliGRAM(s) Oral two times a day  levothyroxine 150 MICROGram(s) Oral daily  losartan 100 milliGRAM(s) Oral daily  metoprolol succinate ER 25 milliGRAM(s) Oral daily  pantoprazole    Tablet 40 milliGRAM(s) Oral before breakfast    MEDICATIONS  (PRN):    General: WD NAD  Neurology: A&Ox3, nonfocal, CATHERINE x 4  Eyes: PERRLA/ EOMI, Gross vision intact  ENT/Neck: Neck supple, trachea midline, No JVD, Gross hearing intact  Respiratory: CTA B/L, No wheezing, rales, rhonchi  CV: RRR, S1S2, no murmurs, rubs or gallops  Abdominal: Soft, NT, ND +BS,   Extremities: No edema, + peripheral pulses  Skin: No Rashes, Hematoma, Ecchymosis  Incisions: n/a  Tubes: none   I&O's Summary    12 May 2025 07:01  -  13 May 2025 07:00  --------------------------------------------------------  IN: 790 mL / OUT: 0 mL / NET: 790 mL      Marital Status:   Lives with: Wife ; Daughter is involved in support  Recent Travel: No recent travel  Occupation: Retired     Substance Use (street drugs): ( x ) never used  (  ) other:  Tobacco Usage: 20+ py; stopped 41 years ago  Alcohol Usage: 2 or more glasses of wine per day     Subjective:  Patient seen and assessed at bedside with thoracic surgery team during AM rounds   No fevers/chills, SOB, chest pain, headaches, dizziness, N/V  Patient resting comfortably in chair  Increased Hydralazine 5/12, BP improved  Plan for IR placement of pericardial effusion drainage.  Case discussed during TEAMS rounds    Vital Signs:  Vital Signs Last 24 Hrs  T(C): 36.7 (05-13-25 @ 08:48), Max: 36.7 (05-12-25 @ 10:44)  T(F): 98 (05-13-25 @ 08:48), Max: 98 (05-13-25 @ 08:30)  HR: 88 (05-13-25 @ 08:48) (80 - 112)  BP: 149/88 (05-13-25 @ 08:48) (144/85 - 180/90)  RR: 18 (05-13-25 @ 08:48) (18 - 18)  SpO2: 99% (05-13-25 @ 08:48) (96% - 100%) on (O2)    Telemetry/Alarms:  Relevant labs, radiology and Medications reviewed                        12.6   5.85  )-----------( 178      ( 13 May 2025 06:39 )             36.1     05-13    136  |  103  |  40[H]  ----------------------------<  101[H]  3.9   |  19[L]  |  1.92[H]    Ca    9.5      13 May 2025 06:39  Phos  3.1     05-13  Mg     1.80     05-13    TPro  6.6  /  Alb  4.0  /  TBili  0.4  /  DBili  x   /  AST  18  /  ALT  10  /  AlkPhos  87  05-11    PT/INR - ( 13 May 2025 06:39 )   PT: 12.4 sec;   INR: 1.04 ratio         PTT - ( 13 May 2025 06:39 )  PTT:27.8 sec  MEDICATIONS  (STANDING):  allopurinol 300 milliGRAM(s) Oral daily  artificial  tears Solution 1 Drop(s) Both EYES four times a day  atorvastatin 10 milliGRAM(s) Oral at bedtime  calcium carbonate 1250 mG  + Vitamin D (OsCal 500 + D) 1 Tablet(s) Oral daily  colchicine 0.6 milliGRAM(s) Oral daily  cycloSPORINE (RESTASIS) 0.05% Emulsion 1 Drop(s) Both EYES every 12 hours  hydrALAZINE 50 milliGRAM(s) Oral two times a day  levothyroxine 150 MICROGram(s) Oral daily  losartan 100 milliGRAM(s) Oral daily  metoprolol succinate ER 25 milliGRAM(s) Oral daily  pantoprazole    Tablet 40 milliGRAM(s) Oral before breakfast    MEDICATIONS  (PRN):    General: WD NAD  Neurology: A&Ox3, nonfocal, CATHERINE x 4  Eyes: PERRLA/ EOMI, Gross vision intact  ENT/Neck: Neck supple, trachea midline, No JVD, Gross hearing intact  Respiratory: CTA B/L, No wheezing, rales, rhonchi  CV: RRR, S1S2, no murmurs, rubs or gallops  Abdominal: Soft, NT, ND +BS,   Extremities: No edema, + peripheral pulses  Skin: No Rashes, Hematoma, Ecchymosis  Incisions: n/a  Tubes: none   I&O's Summary    12 May 2025 07:01  -  13 May 2025 07:00  --------------------------------------------------------  IN: 790 mL / OUT: 0 mL / NET: 790 mL      Marital Status:   Lives with: Wife ; Daughter is involved in support  Recent Travel: No recent travel  Occupation: Retired     Substance Use (street drugs): ( x ) never used  (  ) other:  Tobacco Usage: 20+ py; stopped 41 years ago  Alcohol Usage: 2 or more glasses of wine per day

## 2025-05-13 NOTE — PROGRESS NOTE ADULT - ATTENDING COMMENTS
agree with above.   BP is improved.   Awaiting IR pericardiocentesis today.   Follow up with outpatient cardiologist within 1-2 weeks of discharge.

## 2025-05-13 NOTE — PRE PROCEDURE NOTE - PRE PROCEDURE EVALUATION
Interventional Radiology    HPI:  Interventional Radiology  89y Male  presents with pericardial effusion, increased despite colchicine, with hypertensive urgency  Patient presents to IR for drainage.      Allergies: No Known Allergies      Medications (Abx/Cardiac/Anticoagulation/Blood Products)  hydrALAZINE: 25 milliGRAM(s) Oral (05-12 @ 05:08)  hydrALAZINE: 50 milliGRAM(s) Oral (05-13 @ 05:39)  hydrALAZINE Injectable: 10 milliGRAM(s) IV Push (05-12 @ 12:15)  losartan: 100 milliGRAM(s) Oral (05-13 @ 05:39)  metoprolol succinate ER: 25 milliGRAM(s) Oral (05-13 @ 05:39)      Home Medications  allopurinol 300 mg oral tablet: 1 tab(s) orally once a day AM  Calcium 500+D: 1 tab(s) orally once a day  colchicine 0.6 mg oral capsule: 1 cap(s) orally once a day  hydrALAZINE 10 mg oral tablet: 1 tab(s) orally once a day  levothyroxine 150 mcg (0.15 mg) oral tablet: 1 tab(s) orally once a day  losartan 50 mg oral tablet: 1 tab(s) orally once a day  lovastatin 20 mg oral tablet: 1 tab(s) orally once a day  rabeprazole 20 mg oral delayed release tablet: 1 tab(s) orally once a day  Restasis 0.05% ophthalmic emulsion: 1 drop(s) in each eye every 12 hours  Systane Preservative Free ophthalmic solution: 1 drop(s) in each eye 4 times a day      -WBC 5.85 / HgB 12.6 / Hct 36.1 / Plt 178  -Na 136 / Cl 103 / BUN 40 / Glucose 101  -K 3.9 / CO2 19 / Cr 1.92  -ALT -- / Alk Phos -- / T.Bili --  -INR1.04      Imaging:         -Risks/Benefits/alternatives were previously explained with the patient and his wife yesterday (as patient was confused) and witnessed informed consent obtained.  Following consent, BP noted to be elevated, ranging from 200/150 to 185/115 mmHg, procedure was rescheduled.  -Today diastolic BP more controlled.   - Patient remains confused today. Assent was obtained from patient, and wife was notified of unchanged plan for procedure, consent previously obtained

## 2025-05-13 NOTE — PROGRESS NOTE ADULT - ASSESSMENT
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 Echo done 4/2025 showed increased moderate pericardial effusion. Despite colchicine tx, CT chest 5/1/25 still showed moderate pericardial effusion. Plan for IR drainage of effusion with Dr Rascon.  5/12 - per IR, pt elevated BP, will reschedule procedure until netter BP control    Plan:  Neuro: Pain management continued.  Pulm: Encourage coughing, deep breathing and use of incentive spirometry. Daily CXR.   Cardio: Monitor telemetry/alarms. Elevated blood pressures - will increase hydralazine dose to 50mg BID.  GI: Diet restarted. NPO at midnight pre IR procedure.  Renal: Monitor urine output, supplement electrolytes as needed.  Vasc: SCDs for DVT prophylaxis.  Heme: Stable H/H. AM labs.  ID: Stable.  Therapy: OOB/ambulate ad jeannette.  Disposition: Plan for IR drainage of pericardial effusion - reschedule to tomorrow, 5/13.  Discussed with Cardiothoracic Team at AM rounds and Dr Rai. 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 Echo done 4/2025 showed increased moderate pericardial effusion. Despite colchicine tx, CT chest 5/1/25 still showed moderate pericardial effusion. Plan for IR drainage of effusion with Dr Rascon.  5/12 - Per IR, pt elevated BP, will reschedule procedure until better BP control    Plan:  Neuro: Pain management continued.  Pulm: Encourage coughing, deep breathing and use of incentive spirometry. Daily CXR.   Cardio: Monitor telemetry/alarms.    - increased hydralazine dose to 50mg BID. blood pressures optimized.   - Cardiology consult appreciated   -  f/u pericardial effusion fluids studies   GI: Diet restarted. NPO at midnight pre IR procedure.  Renal: Monitor urine output, supplement electrolytes as needed.  Vasc: SCDs for DVT prophylaxis.  Heme: Stable H/H. AM labs.  ID: Stable.  Therapy: OOB/ambulate ad jeannette.  Disposition: Plan for IR drainage of pericardial effusion today, BP optimized   Discussed with Cardiothoracic Team at AM rounds and Dr Rai.    Goyo Guzman PA-C  Department of Thoracic Surgery  [id number]

## 2025-05-13 NOTE — PROCEDURE NOTE - SPECIMEN OBTAINED
610cc clear serous/Fluid sent for chemistry/Fluid sent for cytology/Fluid sent for gram stain and culture

## 2025-05-14 LAB
ANION GAP SERPL CALC-SCNC: 15 MMOL/L — HIGH (ref 7–14)
BUN SERPL-MCNC: 42 MG/DL — HIGH (ref 7–23)
CALCIUM SERPL-MCNC: 9 MG/DL — SIGNIFICANT CHANGE UP (ref 8.4–10.5)
CHLORIDE SERPL-SCNC: 104 MMOL/L — SIGNIFICANT CHANGE UP (ref 98–107)
CO2 SERPL-SCNC: 17 MMOL/L — LOW (ref 22–31)
CREAT SERPL-MCNC: 1.99 MG/DL — HIGH (ref 0.5–1.3)
EGFR: 31 ML/MIN/1.73M2 — LOW
EGFR: 31 ML/MIN/1.73M2 — LOW
GLUCOSE SERPL-MCNC: 98 MG/DL — SIGNIFICANT CHANGE UP (ref 70–99)
HCT VFR BLD CALC: 33.6 % — LOW (ref 39–50)
HGB BLD-MCNC: 12.1 G/DL — LOW (ref 13–17)
MAGNESIUM SERPL-MCNC: 1.7 MG/DL — SIGNIFICANT CHANGE UP (ref 1.6–2.6)
MCHC RBC-ENTMCNC: 35.5 PG — HIGH (ref 27–34)
MCHC RBC-ENTMCNC: 36 G/DL — SIGNIFICANT CHANGE UP (ref 32–36)
MCV RBC AUTO: 98.5 FL — SIGNIFICANT CHANGE UP (ref 80–100)
NRBC # BLD AUTO: 0 K/UL — SIGNIFICANT CHANGE UP (ref 0–0)
NRBC # FLD: 0 K/UL — SIGNIFICANT CHANGE UP (ref 0–0)
NRBC BLD AUTO-RTO: 0 /100 WBCS — SIGNIFICANT CHANGE UP (ref 0–0)
PHOSPHATE SERPL-MCNC: 3 MG/DL — SIGNIFICANT CHANGE UP (ref 2.5–4.5)
PLATELET # BLD AUTO: 161 K/UL — SIGNIFICANT CHANGE UP (ref 150–400)
POTASSIUM SERPL-MCNC: 3.4 MMOL/L — LOW (ref 3.5–5.3)
POTASSIUM SERPL-SCNC: 3.4 MMOL/L — LOW (ref 3.5–5.3)
RBC # BLD: 3.41 M/UL — LOW (ref 4.2–5.8)
RBC # FLD: 14.1 % — SIGNIFICANT CHANGE UP (ref 10.3–14.5)
SODIUM SERPL-SCNC: 136 MMOL/L — SIGNIFICANT CHANGE UP (ref 135–145)
WBC # BLD: 5.67 K/UL — SIGNIFICANT CHANGE UP (ref 3.8–10.5)
WBC # FLD AUTO: 5.67 K/UL — SIGNIFICANT CHANGE UP (ref 3.8–10.5)

## 2025-05-14 PROCEDURE — 99232 SBSQ HOSP IP/OBS MODERATE 35: CPT

## 2025-05-14 PROCEDURE — 71045 X-RAY EXAM CHEST 1 VIEW: CPT | Mod: 26

## 2025-05-14 PROCEDURE — 99232 SBSQ HOSP IP/OBS MODERATE 35: CPT | Mod: FS

## 2025-05-14 RX ORDER — HEPARIN SODIUM 1000 [USP'U]/ML
5000 INJECTION INTRAVENOUS; SUBCUTANEOUS EVERY 8 HOURS
Refills: 0 | Status: DISCONTINUED | OUTPATIENT
Start: 2025-05-14 | End: 2025-05-19

## 2025-05-14 RX ADMIN — ATORVASTATIN CALCIUM 10 MILLIGRAM(S): 80 TABLET, FILM COATED ORAL at 22:01

## 2025-05-14 RX ADMIN — HEPARIN SODIUM 5000 UNIT(S): 1000 INJECTION INTRAVENOUS; SUBCUTANEOUS at 18:11

## 2025-05-14 RX ADMIN — Medication 1 DROP(S): at 18:24

## 2025-05-14 RX ADMIN — Medication 1 DROP(S): at 05:58

## 2025-05-14 RX ADMIN — CYCLOSPORINE OPHTHALMIC SOLUTION 1 DROP(S): 1 SOLUTION/ DROPS OPHTHALMIC at 05:59

## 2025-05-14 RX ADMIN — Medication 150 MICROGRAM(S): at 05:58

## 2025-05-14 RX ADMIN — Medication 50 MILLIGRAM(S): at 18:24

## 2025-05-14 RX ADMIN — Medication 50 MILLIGRAM(S): at 05:58

## 2025-05-14 RX ADMIN — HEPARIN SODIUM 5000 UNIT(S): 1000 INJECTION INTRAVENOUS; SUBCUTANEOUS at 09:58

## 2025-05-14 RX ADMIN — Medication 300 MILLIGRAM(S): at 13:27

## 2025-05-14 RX ADMIN — METOPROLOL SUCCINATE 25 MILLIGRAM(S): 50 TABLET, EXTENDED RELEASE ORAL at 05:58

## 2025-05-14 RX ADMIN — Medication 1 DROP(S): at 22:01

## 2025-05-14 RX ADMIN — CYCLOSPORINE OPHTHALMIC SOLUTION 1 DROP(S): 1 SOLUTION/ DROPS OPHTHALMIC at 18:56

## 2025-05-14 RX ADMIN — Medication 1 TABLET(S): at 13:27

## 2025-05-14 RX ADMIN — HEPARIN SODIUM 5000 UNIT(S): 1000 INJECTION INTRAVENOUS; SUBCUTANEOUS at 22:01

## 2025-05-14 RX ADMIN — Medication 40 MILLIGRAM(S): at 05:58

## 2025-05-14 RX ADMIN — LOSARTAN POTASSIUM 100 MILLIGRAM(S): 100 TABLET, FILM COATED ORAL at 05:58

## 2025-05-14 RX ADMIN — COLCHICINE 0.6 MILLIGRAM(S): 0.6 TABLET, FILM COATED ORAL at 13:28

## 2025-05-14 RX ADMIN — Medication 10 MILLIGRAM(S): at 00:42

## 2025-05-14 NOTE — PROGRESS NOTE ADULT - SUBJECTIVE AND OBJECTIVE BOX
89y Male s/p pericardial drainage on 5/13 in Interventional Radiology.     Patient seen and examined at bedside, resting comfortably. Denies chest pain, SOB, nausea, vomiting.     T(F): 97.4 (05-14-25 @ 10:03), Max: 98.2 (05-13-25 @ 12:00)  HR: 84 (05-14-25 @ 10:03) (84 - 93)  BP: 133/58 (05-14-25 @ 10:03) (133/58 - 190/102)  RR: 18 (05-14-25 @ 10:03) (16 - 18)  SpO2: 98% (05-14-25 @ 10:03) (95% - 99%)  Wt(kg): --    LABS:                        12.1   5.67  )-----------( 161      ( 14 May 2025 06:55 )             33.6     05-14    136  |  104  |  42[H]  ----------------------------<  98  3.4[L]   |  17[L]  |  1.99[H]    Ca    9.0      14 May 2025 06:55  Phos  3.0     05-14  Mg     1.70     05-14      PT/INR - ( 13 May 2025 06:39 )   PT: 12.4 sec;   INR: 1.04 ratio         PTT - ( 13 May 2025 06:39 )  PTT:27.8 sec  I&O's Detail    13 May 2025 07:01  -  14 May 2025 07:00  --------------------------------------------------------  IN:    Oral Fluid: 610 mL  Total IN: 610 mL    OUT:    Chest Tube (mL): 174 mL    Voided (mL): 450 mL  Total OUT: 624 mL    Total NET: -14 mL      14 May 2025 07:01  -  14 May 2025 10:35  --------------------------------------------------------  IN:    Oral Fluid: 240 mL  Total IN: 240 mL    OUT:    Chest Tube (mL): 40 mL  Total OUT: 40 mL    Total NET: 200 mL      PHYSICAL EXAM:  General: Nontoxic, resting comfortably in NAD  Pericardial Drain: dressing c/d/i. Drain to pleurevac

## 2025-05-14 NOTE — PROGRESS NOTE ADULT - ASSESSMENT
89 yo M s/p Left VATS, LLL wedge resection on 7/9/2015 for Stage IA (pT1b pN0 pMx) adenocarcinoma, and s/p Right VATS, RLL wedge resection on 1/30/2014 for Stage IA (pT1a pN0 pMx) adenocarcinoma presents with pericardial effusion identified on outpatient imaging for pericardial drainage. Patient is now s/p pericardial drainage on 5/13 in Interventional Radiology.     Plan:  - Continue drainage  - Will need TTE once outputs from drain are less than 10ccs per 24 hours.  - Can reconsult IR or call [id number] once TTE is performed     [id number] IR PA

## 2025-05-14 NOTE — PROGRESS NOTE ADULT - SUBJECTIVE AND OBJECTIVE BOX
Cardiology Progress Note  ------------------------------------------------------------------------------------------  SUBJECTIVE:   No events overnight. Denies CP, SOB or Palpitations.   s/p pericardial drain.   -------------------------------------------------------------------------------------------  ROS:  CV: chest pain (-), palpitation (-), orthopnea (-), PND (-), edema (-)  PULM: SOB (-), cough (-), wheezing (-), hemoptysis (-).   CONST: fever (-), chills (-) or fatigue (-)  GI: abdominal distension (-), abdominal pain (-) , nausea/vomiting (-), hematemesis, (-), melena (-), hematochezia (-)  : dysuria (-), frequency (-), hematuria (-).   NEURO: numbness (-), weakness (-), dizziness (-)  MSK: myalgia (-), joint pain (-)   SKIN: itching (-), rash (-)  HEENT:  visual changes (-); vertigo or throat pain (-);  neck stiffness (-)   Psych: change in mood (-), anxiety (-), depression (-)     All other review of systems is negative unless indicated above.   -------------------------------------------------------------------------------------------  VS:  T(F): 97.4 (05-14), Max: 97.9 (05-13)  HR: 85 (05-14) (84 - 92)  BP: 115/51 (05-14) (115/51 - 182/86)  RR: 18 (05-14)  SpO2: 97% (05-14)  I&O's Summary    13 May 2025 07:01  -  14 May 2025 07:00  --------------------------------------------------------  IN: 610 mL / OUT: 624 mL / NET: -14 mL    14 May 2025 07:01  -  14 May 2025 16:33  --------------------------------------------------------  IN: 480 mL / OUT: 40 mL / NET: 440 mL      ------------------------------------------------------------------------------------------  PHYSICAL EXAM:  General: WD NAD  Neurology: A&Ox3, nonfocal, CATHERINE x 4  Eyes: PERRLA/ EOMI, Gross vision intact  ENT/Neck: Neck supple, trachea midline, No JVD, Gross hearing intact  Respiratory: CTA B/L, No wheezing, rales, rhonchi. dec BS to bilat LL  CV: Irreg, S1S2, no murmurs, rubs or gallops  Abdominal: Soft, NT, ND +BS, no BM voiding.   Extremities: No edema, + peripheral pulses  Skin: No Rashes, Hematoma, Ecchymosis  Incisions: none  Tubes: Pericardial drain- 600cc in IR, now additional 200cc since placement. On WS to pleuravac.   I&O's Summary      -------------------------------------------------------------------------------------------  LABS:  05-14    136  |  104  |  42[H]  ----------------------------<  98  3.4[L]   |  17[L]  |  1.99[H]    Ca    9.0      14 May 2025 06:55  Phos  3.0     05-14  Mg     1.70     05-14      Creatinine Trend: 1.99<--, 1.92<--, 1.72<--, 1.73<--                        12.1   5.67  )-----------( 161      ( 14 May 2025 06:55 )             33.6     PT/INR - ( 13 May 2025 06:39 )   PT: 12.4 sec;   INR: 1.04 ratio         PTT - ( 13 May 2025 06:39 )  PTT:27.8 sec    Lipid Panel: T(F): 97.4 (05-14), Max: 97.9 (05-13)  HR: 85 (05-14) (84 - 92)  BP: 115/51 (05-14) (115/51 - 182/86)  RR: 18 (05-14)  SpO2: 97% (05-14)  Cardiac Enzymes:         -------------------------------------------------------------------------------------------  Meds:  allopurinol 300 milliGRAM(s) Oral daily  artificial  tears Solution 1 Drop(s) Both EYES four times a day  atorvastatin 10 milliGRAM(s) Oral at bedtime  calcium carbonate 1250 mG  + Vitamin D (OsCal 500 + D) 1 Tablet(s) Oral daily  colchicine 0.6 milliGRAM(s) Oral daily  cycloSPORINE (RESTASIS) 0.05% Emulsion 1 Drop(s) Both EYES every 12 hours  heparin   Injectable 5000 Unit(s) SubCutaneous every 8 hours  hydrALAZINE 50 milliGRAM(s) Oral two times a day  levothyroxine 150 MICROGram(s) Oral daily  losartan 100 milliGRAM(s) Oral daily  metoprolol succinate ER 25 milliGRAM(s) Oral daily  pantoprazole    Tablet 40 milliGRAM(s) Oral before breakfast    -------------------------------------------------------------------------------------------  Cardiovascular Diagnostic Testing:  -------------------------------------------------------------------------------------------  ECG:     Echo:     Stress Testing:    Cath:    Imaging:    CXR:  reviewed  -------------------------------------------------------------------------------------------  Assessment and Plan:   -------------------------------------------------------------------------------------------  Problems Assessed:   Pt 89 YO M with Hx of HTN, Afib not on home AC, hypothyroidism, AS s/p TAVR, presented to the hospital after routine TTE showed moderate size pericardial effusion at the cardiology office, increased in size prior to the year prior. Pt was sent to IR for pericardial drain, however was noted to be hypertensive to 200/150. Procedure aborted thereafter. Pt asymptomatic without signs of end organ failure. Cardiology consulted for BP control.   ,  Problems Assessed:   1. Hypertensive urgency- resolved.   2. Moderate pericardial effusion  3. Afib not on home AC      Plan:   - Pt asymptomatic now s/p IR pericardiocentesis  - Per [employer]: pt on home hydralazine 25 BID, Losartan 100mg daily, Verapamil 120 daily  - Hydralazine 50 BID  - Losartan 100 daily   - metoprolol succinate 25 daily   - f/u pericardial effusion fluids studies   - pt will benefited from tightly BP control outpatient with goal 130/80  - continue colchicine     ------------------------------------------------------------------------------------------  Freeman Herrera MD   of Cardiology  [facility] at [facility]  [address] [facility], [address] [place] [address]  Phone: [phone]  Fax: [phone]  Please check [email or web address] for cardiology service schedule and contact information.
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
## 2025-05-14 NOTE — SBIRT NOTE ADULT - NSSBIRTALCNOACTINTDET_GEN_A_CORE
Patient enjoys one glass of wine occasionally. Patient's alcohol consumption is not considered high risk or dangerous to self or others. Social work encouraged safe drinking habits. Patient is in agreement. No further SW needs.

## 2025-05-15 ENCOUNTER — RESULT REVIEW (OUTPATIENT)
Age: 89
End: 2025-05-15

## 2025-05-15 LAB
ANION GAP SERPL CALC-SCNC: 12 MMOL/L — SIGNIFICANT CHANGE UP (ref 7–14)
BUN SERPL-MCNC: 42 MG/DL — HIGH (ref 7–23)
CALCIUM SERPL-MCNC: 9 MG/DL — SIGNIFICANT CHANGE UP (ref 8.4–10.5)
CHLORIDE SERPL-SCNC: 104 MMOL/L — SIGNIFICANT CHANGE UP (ref 98–107)
CO2 SERPL-SCNC: 20 MMOL/L — LOW (ref 22–31)
CREAT SERPL-MCNC: 2.04 MG/DL — HIGH (ref 0.5–1.3)
EGFR: 30 ML/MIN/1.73M2 — LOW
EGFR: 30 ML/MIN/1.73M2 — LOW
GLUCOSE SERPL-MCNC: 91 MG/DL — SIGNIFICANT CHANGE UP (ref 70–99)
HCT VFR BLD CALC: 33.4 % — LOW (ref 39–50)
HGB BLD-MCNC: 11.5 G/DL — LOW (ref 13–17)
MCHC RBC-ENTMCNC: 34.4 G/DL — SIGNIFICANT CHANGE UP (ref 32–36)
MCHC RBC-ENTMCNC: 35.3 PG — HIGH (ref 27–34)
MCV RBC AUTO: 102.5 FL — HIGH (ref 80–100)
NRBC # BLD AUTO: 0 K/UL — SIGNIFICANT CHANGE UP (ref 0–0)
NRBC # FLD: 0 K/UL — SIGNIFICANT CHANGE UP (ref 0–0)
NRBC BLD AUTO-RTO: 0 /100 WBCS — SIGNIFICANT CHANGE UP (ref 0–0)
PLATELET # BLD AUTO: 152 K/UL — SIGNIFICANT CHANGE UP (ref 150–400)
POTASSIUM SERPL-MCNC: 3.4 MMOL/L — LOW (ref 3.5–5.3)
POTASSIUM SERPL-SCNC: 3.4 MMOL/L — LOW (ref 3.5–5.3)
RBC # BLD: 3.26 M/UL — LOW (ref 4.2–5.8)
RBC # FLD: 13.9 % — SIGNIFICANT CHANGE UP (ref 10.3–14.5)
SODIUM SERPL-SCNC: 136 MMOL/L — SIGNIFICANT CHANGE UP (ref 135–145)
WBC # BLD: 5.21 K/UL — SIGNIFICANT CHANGE UP (ref 3.8–10.5)
WBC # FLD AUTO: 5.21 K/UL — SIGNIFICANT CHANGE UP (ref 3.8–10.5)

## 2025-05-15 PROCEDURE — 99232 SBSQ HOSP IP/OBS MODERATE 35: CPT

## 2025-05-15 PROCEDURE — 99231 SBSQ HOSP IP/OBS SF/LOW 25: CPT

## 2025-05-15 PROCEDURE — 93308 TTE F-UP OR LMTD: CPT | Mod: 26,GC

## 2025-05-15 PROCEDURE — 93325 DOPPLER ECHO COLOR FLOW MAPG: CPT | Mod: 26,GC

## 2025-05-15 RX ADMIN — Medication 1 DROP(S): at 17:56

## 2025-05-15 RX ADMIN — Medication 300 MILLIGRAM(S): at 14:13

## 2025-05-15 RX ADMIN — Medication 50 MILLIGRAM(S): at 05:13

## 2025-05-15 RX ADMIN — Medication 40 MILLIGRAM(S): at 05:13

## 2025-05-15 RX ADMIN — METOPROLOL SUCCINATE 25 MILLIGRAM(S): 50 TABLET, EXTENDED RELEASE ORAL at 05:13

## 2025-05-15 RX ADMIN — HEPARIN SODIUM 5000 UNIT(S): 1000 INJECTION INTRAVENOUS; SUBCUTANEOUS at 05:14

## 2025-05-15 RX ADMIN — ATORVASTATIN CALCIUM 10 MILLIGRAM(S): 80 TABLET, FILM COATED ORAL at 21:44

## 2025-05-15 RX ADMIN — LOSARTAN POTASSIUM 100 MILLIGRAM(S): 100 TABLET, FILM COATED ORAL at 05:14

## 2025-05-15 RX ADMIN — HEPARIN SODIUM 5000 UNIT(S): 1000 INJECTION INTRAVENOUS; SUBCUTANEOUS at 21:44

## 2025-05-15 RX ADMIN — COLCHICINE 0.6 MILLIGRAM(S): 0.6 TABLET, FILM COATED ORAL at 14:12

## 2025-05-15 RX ADMIN — CYCLOSPORINE OPHTHALMIC SOLUTION 1 DROP(S): 1 SOLUTION/ DROPS OPHTHALMIC at 05:14

## 2025-05-15 RX ADMIN — CYCLOSPORINE OPHTHALMIC SOLUTION 1 DROP(S): 1 SOLUTION/ DROPS OPHTHALMIC at 17:56

## 2025-05-15 RX ADMIN — Medication 1 TABLET(S): at 14:12

## 2025-05-15 RX ADMIN — Medication 1 DROP(S): at 05:14

## 2025-05-15 RX ADMIN — Medication 150 MICROGRAM(S): at 05:13

## 2025-05-15 RX ADMIN — Medication 10 MILLIEQUIVALENT(S): at 17:55

## 2025-05-15 RX ADMIN — Medication 50 MILLIGRAM(S): at 17:58

## 2025-05-15 RX ADMIN — HEPARIN SODIUM 5000 UNIT(S): 1000 INJECTION INTRAVENOUS; SUBCUTANEOUS at 14:14

## 2025-05-15 RX ADMIN — Medication 1 DROP(S): at 21:44

## 2025-05-15 NOTE — PROGRESS NOTE ADULT - SUBJECTIVE AND OBJECTIVE BOX
Cardiology Progress Note  ------------------------------------------------------------------------------------------  SUBJECTIVE:   No events overnight. Denies CP, SOB or Palpitations.   Drain output decreasing   -------------------------------------------------------------------------------------------  ROS:  CV: chest pain (-), palpitation (-), orthopnea (-), PND (-), edema (-)  PULM: SOB (-), cough (-), wheezing (-), hemoptysis (-).   CONST: fever (-), chills (-) or fatigue (-)  GI: abdominal distension (-), abdominal pain (-) , nausea/vomiting (-), hematemesis, (-), melena (-), hematochezia (-)  : dysuria (-), frequency (-), hematuria (-).   NEURO: numbness (-), weakness (-), dizziness (-)  MSK: myalgia (-), joint pain (-)   SKIN: itching (-), rash (-)  HEENT:  visual changes (-); vertigo or throat pain (-);  neck stiffness (-)   Psych: change in mood (-), anxiety (-), depression (-)     All other review of systems is negative unless indicated above.   -------------------------------------------------------------------------------------------  VS:  T(F): 97.8 (05-15), Max: 98.5 (05-14)  HR: 82 (05-15) (75 - 101)  BP: 114/40 (05-15) (114/40 - 154/98)  RR: 18 (05-15)  SpO2: 98% (05-15)  I&O's Summary    14 May 2025 07:01  -  15 May 2025 07:00  --------------------------------------------------------  IN: 1200 mL / OUT: 95 mL / NET: 1105 mL    15 May 2025 07:01  -  15 May 2025 14:59  --------------------------------------------------------  IN: 480 mL / OUT: 180 mL / NET: 300 mL      ------------------------------------------------------------------------------------------  PHYSICAL EXAM:  General: WD NAD  Neurology: A&Ox3, nonfocal, CATHERINE x 4  Eyes: PERRLA/ EOMI, Gross vision intact  ENT/Neck: Neck supple, trachea midline, No JVD, Gross hearing intact  Respiratory: CTA B/L, No wheezing, rales, rhonchi. dec BS to bilat LL  CV: Irreg, S1S2, no murmurs, rubs or gallops  Abdominal: Soft, NT, ND +BS, no BM voiding.   Extremities: No edema, + peripheral pulses  Skin: No Rashes, Hematoma, Ecchymosis  Incisions: none  -------------------------------------------------------------------------------------------  LABS:  05-15    136  |  104  |  42[H]  ----------------------------<  91  3.4[L]   |  20[L]  |  2.04[H]    Ca    9.0      15 May 2025 04:48  Phos  3.0     05-14  Mg     1.70     05-14      Creatinine Trend: 2.04<--, 1.99<--, 1.92<--, 1.72<--, 1.73<--                        11.5   5.21  )-----------( 152      ( 15 May 2025 04:48 )             33.4         Lipid Panel: T(F): 97.8 (05-15), Max: 98.5 (05-14)  HR: 82 (05-15) (75 - 101)  BP: 114/40 (05-15) (114/40 - 154/98)  RR: 18 (05-15)  SpO2: 98% (05-15)  Cardiac Enzymes:         -------------------------------------------------------------------------------------------  Meds:  allopurinol 300 milliGRAM(s) Oral daily  artificial  tears Solution 1 Drop(s) Both EYES four times a day  atorvastatin 10 milliGRAM(s) Oral at bedtime  calcium carbonate 1250 mG  + Vitamin D (OsCal 500 + D) 1 Tablet(s) Oral daily  colchicine 0.6 milliGRAM(s) Oral daily  cycloSPORINE (RESTASIS) 0.05% Emulsion 1 Drop(s) Both EYES every 12 hours  heparin   Injectable 5000 Unit(s) SubCutaneous every 8 hours  hydrALAZINE 50 milliGRAM(s) Oral two times a day  levothyroxine 150 MICROGram(s) Oral daily  losartan 100 milliGRAM(s) Oral daily  metoprolol succinate ER 25 milliGRAM(s) Oral daily  pantoprazole    Tablet 40 milliGRAM(s) Oral before breakfast    -------------------------------------------------------------------------------------------  Cardiovascular Diagnostic Testing:  -------------------------------------------------------------------------------------------  ECG:     Echo:     Stress Testing:    Cath:    Imaging:    CXR:  reviewed  -------------------------------------------------------------------------------------------  Assessment and Plan:   -------------------------------------------------------------------------------------------  Pt 89 YO M with Hx of HTN, Afib not on home AC, hypothyroidism, AS s/p TAVR, presented to the hospital after routine TTE showed moderate size pericardial effusion at the cardiology office, increased in size prior to the year prior. Pt was sent to IR for pericardial drain, however was noted to be hypertensive to 200/150. Procedure aborted thereafter. Pt asymptomatic without signs of end organ failure. Cardiology consulted for BP control.   ,  Problems Assessed:   1. Hypertensive urgency- resolved.   2. Moderate pericardial effusion  3. Chronic Afib not on home AC- rate controlled.       Plan:   - s/p IR pericardiocentesis. Drain management as per IR.   - continue current BP regimen.   - f/u pericardial effusion fluids studies   - continue colchicine     ------------------------------------------------------------------------------------------  Freeman Herrera MD   of Cardiology  [facility]  [address], [address] [place] [address]  Phone: [phone]  Fax: [phone]  Please check [email or web address] for cardiology service schedule and contact information.

## 2025-05-15 NOTE — PHYSICAL THERAPY INITIAL EVALUATION ADULT - PERTINENT HX OF CURRENT PROBLEM, REHAB EVAL
As per chart review, " 89y Male with PMHx of afib (not on AC), pericardial effusion, aortic stenosis s/p TAVR, asbestos exposure, BPH s/p TURP, 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 Echo done 4/2025 showed increased moderate pericardial effusion. Despite colchicine tx, CT chest 5/1/25 still showed moderate pericardial effusion. Plan for IR drainage of effusion with Dr Rascon.  5/12 - per IR, pt elevated BP, will reschedule procedure until netter BP control"

## 2025-05-15 NOTE — PHYSICAL THERAPY INITIAL EVALUATION ADULT - LEVEL OF INDEPENDENCE: GAIT, REHAB EVAL
Pt ambulated 5 feet with no assistive devices, slightly unsteady, then ambulated 50 feet using rolling walker/contact guard

## 2025-05-15 NOTE — PHYSICAL THERAPY INITIAL EVALUATION ADULT - LEVEL OF CONSCIOUSNESS, REHAB EVAL
alert Mucosal Advancement Flap Text: Given the location of the defect, shape of the defect and the proximity to free margins a mucosal advancement flap was deemed most appropriate. Incisions were made with a 15 blade scalpel in the appropriate fashion along the cutaneous vermilion border and the mucosal lip. The remaining actinically damaged mucosal tissue was excised.  The mucosal advancement flap was then elevated to the gingival sulcus with care taken to preserve the neurovascular structures and advanced into the primary defect. Care was taken to ensure that precise realignment of the vermilion border was achieved.
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
## 2025-05-15 NOTE — PHYSICAL THERAPY INITIAL EVALUATION ADULT - ADDITIONAL COMMENTS
Pt states he lives with his wife in a house with 5 steps to enter and remains on the main floor. Prior to admission, pt was ambulating independently without any devices. Pt denies any recent falls or home care.   Post PT evaluation, pt left seated in chair, chair alarm on, call bell and remote within reach, all precautions maintained, NAD. RN aware.

## 2025-05-16 ENCOUNTER — TRANSCRIPTION ENCOUNTER (OUTPATIENT)
Age: 89
End: 2025-05-16

## 2025-05-16 LAB
ANION GAP SERPL CALC-SCNC: 9 MMOL/L — SIGNIFICANT CHANGE UP (ref 7–14)
BASOPHILS # BLD AUTO: 0.04 K/UL — SIGNIFICANT CHANGE UP (ref 0–0.2)
BASOPHILS NFR BLD AUTO: 0.9 % — SIGNIFICANT CHANGE UP (ref 0–2)
BUN SERPL-MCNC: 35 MG/DL — HIGH (ref 7–23)
CALCIUM SERPL-MCNC: 9.5 MG/DL — SIGNIFICANT CHANGE UP (ref 8.4–10.5)
CHLORIDE SERPL-SCNC: 106 MMOL/L — SIGNIFICANT CHANGE UP (ref 98–107)
CO2 SERPL-SCNC: 23 MMOL/L — SIGNIFICANT CHANGE UP (ref 22–31)
CREAT SERPL-MCNC: 2.04 MG/DL — HIGH (ref 0.5–1.3)
EGFR: 30 ML/MIN/1.73M2 — LOW
EGFR: 30 ML/MIN/1.73M2 — LOW
EOSINOPHIL # BLD AUTO: 0.11 K/UL — SIGNIFICANT CHANGE UP (ref 0–0.5)
EOSINOPHIL NFR BLD AUTO: 2.4 % — SIGNIFICANT CHANGE UP (ref 0–6)
GLUCOSE SERPL-MCNC: 104 MG/DL — HIGH (ref 70–99)
HCT VFR BLD CALC: 36.6 % — LOW (ref 39–50)
HGB BLD-MCNC: 12.7 G/DL — LOW (ref 13–17)
IANC: 2.98 K/UL — SIGNIFICANT CHANGE UP (ref 1.8–7.4)
IMM GRANULOCYTES NFR BLD AUTO: 0.4 % — SIGNIFICANT CHANGE UP (ref 0–0.9)
LYMPHOCYTES # BLD AUTO: 0.83 K/UL — LOW (ref 1–3.3)
LYMPHOCYTES # BLD AUTO: 18.2 % — SIGNIFICANT CHANGE UP (ref 13–44)
MAGNESIUM SERPL-MCNC: 1.9 MG/DL — SIGNIFICANT CHANGE UP (ref 1.6–2.6)
MCHC RBC-ENTMCNC: 34.7 G/DL — SIGNIFICANT CHANGE UP (ref 32–36)
MCHC RBC-ENTMCNC: 35.9 PG — HIGH (ref 27–34)
MCV RBC AUTO: 103.4 FL — HIGH (ref 80–100)
MONOCYTES # BLD AUTO: 0.57 K/UL — SIGNIFICANT CHANGE UP (ref 0–0.9)
MONOCYTES NFR BLD AUTO: 12.5 % — SIGNIFICANT CHANGE UP (ref 2–14)
NEUTROPHILS # BLD AUTO: 2.98 K/UL — SIGNIFICANT CHANGE UP (ref 1.8–7.4)
NEUTROPHILS NFR BLD AUTO: 65.6 % — SIGNIFICANT CHANGE UP (ref 43–77)
NON-GYNECOLOGICAL CYTOLOGY STUDY: SIGNIFICANT CHANGE UP
NRBC # BLD AUTO: 0 K/UL — SIGNIFICANT CHANGE UP (ref 0–0)
NRBC # FLD: 0 K/UL — SIGNIFICANT CHANGE UP (ref 0–0)
NRBC BLD AUTO-RTO: 0 /100 WBCS — SIGNIFICANT CHANGE UP (ref 0–0)
PHOSPHATE SERPL-MCNC: 2.9 MG/DL — SIGNIFICANT CHANGE UP (ref 2.5–4.5)
PLATELET # BLD AUTO: 167 K/UL — SIGNIFICANT CHANGE UP (ref 150–400)
POTASSIUM SERPL-MCNC: 4.1 MMOL/L — SIGNIFICANT CHANGE UP (ref 3.5–5.3)
POTASSIUM SERPL-SCNC: 4.1 MMOL/L — SIGNIFICANT CHANGE UP (ref 3.5–5.3)
RBC # BLD: 3.54 M/UL — LOW (ref 4.2–5.8)
RBC # FLD: 14.2 % — SIGNIFICANT CHANGE UP (ref 10.3–14.5)
SODIUM SERPL-SCNC: 138 MMOL/L — SIGNIFICANT CHANGE UP (ref 135–145)
WBC # BLD: 4.55 K/UL — SIGNIFICANT CHANGE UP (ref 3.8–10.5)
WBC # FLD AUTO: 4.55 K/UL — SIGNIFICANT CHANGE UP (ref 3.8–10.5)

## 2025-05-16 PROCEDURE — 99232 SBSQ HOSP IP/OBS MODERATE 35: CPT

## 2025-05-16 PROCEDURE — 71045 X-RAY EXAM CHEST 1 VIEW: CPT | Mod: 26

## 2025-05-16 RX ORDER — MAGNESIUM SULFATE 500 MG/ML
1 SYRINGE (ML) INJECTION ONCE
Refills: 0 | Status: COMPLETED | OUTPATIENT
Start: 2025-05-16 | End: 2025-05-16

## 2025-05-16 RX ORDER — POTASSIUM PHOSPHATE, MONOBASIC POTASSIUM PHOSPHATE, DIBASIC INJECTION, 236; 224 MG/ML; MG/ML
15 SOLUTION, CONCENTRATE INTRAVENOUS ONCE
Refills: 0 | Status: COMPLETED | OUTPATIENT
Start: 2025-05-16 | End: 2025-05-16

## 2025-05-16 RX ADMIN — Medication 300 MILLIGRAM(S): at 12:25

## 2025-05-16 RX ADMIN — CYCLOSPORINE OPHTHALMIC SOLUTION 1 DROP(S): 1 SOLUTION/ DROPS OPHTHALMIC at 17:22

## 2025-05-16 RX ADMIN — COLCHICINE 0.6 MILLIGRAM(S): 0.6 TABLET, FILM COATED ORAL at 11:55

## 2025-05-16 RX ADMIN — Medication 150 MICROGRAM(S): at 06:11

## 2025-05-16 RX ADMIN — Medication 1 DROP(S): at 12:30

## 2025-05-16 RX ADMIN — ATORVASTATIN CALCIUM 10 MILLIGRAM(S): 80 TABLET, FILM COATED ORAL at 21:15

## 2025-05-16 RX ADMIN — LOSARTAN POTASSIUM 100 MILLIGRAM(S): 100 TABLET, FILM COATED ORAL at 06:11

## 2025-05-16 RX ADMIN — POTASSIUM PHOSPHATE, MONOBASIC POTASSIUM PHOSPHATE, DIBASIC INJECTION, 62.5 MILLIMOLE(S): 236; 224 SOLUTION, CONCENTRATE INTRAVENOUS at 12:33

## 2025-05-16 RX ADMIN — HEPARIN SODIUM 5000 UNIT(S): 1000 INJECTION INTRAVENOUS; SUBCUTANEOUS at 21:15

## 2025-05-16 RX ADMIN — Medication 1 DROP(S): at 17:21

## 2025-05-16 RX ADMIN — Medication 40 MILLIGRAM(S): at 06:11

## 2025-05-16 RX ADMIN — Medication 1 DROP(S): at 06:12

## 2025-05-16 RX ADMIN — METOPROLOL SUCCINATE 25 MILLIGRAM(S): 50 TABLET, EXTENDED RELEASE ORAL at 06:11

## 2025-05-16 RX ADMIN — Medication 50 MILLIGRAM(S): at 17:22

## 2025-05-16 RX ADMIN — HEPARIN SODIUM 5000 UNIT(S): 1000 INJECTION INTRAVENOUS; SUBCUTANEOUS at 13:44

## 2025-05-16 RX ADMIN — Medication 100 GRAM(S): at 10:34

## 2025-05-16 RX ADMIN — Medication 1 DROP(S): at 23:46

## 2025-05-16 RX ADMIN — Medication 50 MILLIGRAM(S): at 06:11

## 2025-05-16 RX ADMIN — Medication 1 TABLET(S): at 12:25

## 2025-05-16 RX ADMIN — HEPARIN SODIUM 5000 UNIT(S): 1000 INJECTION INTRAVENOUS; SUBCUTANEOUS at 06:12

## 2025-05-16 RX ADMIN — CYCLOSPORINE OPHTHALMIC SOLUTION 1 DROP(S): 1 SOLUTION/ DROPS OPHTHALMIC at 06:12

## 2025-05-16 NOTE — DISCHARGE NOTE PROVIDER - NSDCFUADDAPPT_GEN_ALL_CORE_FT
See your Cardiologist Dr. Morales within 1-2 weeks or as directed.   Review all cardiac medications with him during f/u visit. Have repeat Echo with Cardiologist as directed.   See Dr. Rai in 2 weeks in office. Call to make your apt. [phone]

## 2025-05-16 NOTE — DISCHARGE NOTE PROVIDER - CARE PROVIDERS DIRECT ADDRESSES
[email or web address]

## 2025-05-16 NOTE — DISCHARGE NOTE NURSING/CASE MANAGEMENT/SOCIAL WORK - PATIENT PORTAL LINK FT
You can access the FollowMyHealth Patient Portal offered by [facility] by registering at the following website: [email or web address]. By joining [facility]’s FollowMyHealth portal, you will also be able to view your health information using other applications (apps) compatible with our system.

## 2025-05-16 NOTE — DISCHARGE NOTE PROVIDER - CARE PROVIDER_API CALL
Casimiro Rai  Thoracic Surgery  [address] [place] [address], [place] [address]  Phone: [phone]  Fax: [phone]  Follow Up Time:     Brayden Morales  Cardiology  [address], [address]  [place] [address]  Phone: [phone]  Fax: [phone]  Follow Up Time:

## 2025-05-16 NOTE — DISCHARGE NOTE NURSING/CASE MANAGEMENT/SOCIAL WORK - FINANCIAL ASSISTANCE
[facility] provides services at a reduced cost to those who are determined to be eligible through [facility]’s financial assistance program. Information regarding [facility]’s financial assistance program can be found by going to [email or web address] or by calling [phone].

## 2025-05-16 NOTE — DISCHARGE NOTE PROVIDER - NSDCFUADDINST_GEN_ALL_CORE_FT
Activity as tolerated  You may remove left chest dressing in 24hrs then leave uncovered and begin to shower.

## 2025-05-16 NOTE — DISCHARGE NOTE PROVIDER - HOSPITAL COURSE
This 89 year old male had a routine follow up with his cardiologist in 4/2025 and was found to be in atrial fibrillation.  An echocardiogram was done to evaluate the heart and it showed a moderate pericardial effusion that had increased in size compared with an echocardiogram from the prior year.  Colchicine was was started to try to reduce the size of the effusion due to its aniinflammatory effects.  However a CT of the chest on 5/1/25 still showed a moderate pericardial effusion.  Pt is now s/p IR placed pericardial drain placed on 5/13/25. Pt's pericardial drain output was monitored for several days and eventually removed on __________.     This 89 year old male had a routine follow up with his cardiologist in 4/2025 and was found to be in atrial fibrillation.  An echocardiogram was done to evaluate the heart and it showed a moderate pericardial effusion that had increased in size compared with an echocardiogram from the prior year.  Colchicine was was started to try to reduce the size of the effusion due to its aniinflammatory effects.  However a CT of the chest on 5/1/25 still showed a moderate pericardial effusion.  Pt is now s/p IR placed pericardial drain placed on 5/13/25. Post drainage Echo showed trace effusion. Pt's pericardial drain output was monitored for several days and eventually removed on 5/19/25 once output was less than 50cc/24hrs. Pt was arranged and approved for RW and home with home PT. Seen by Cardiology for hypertension and Hydralazine inc'd with better b/p control. Today, pt feels well. Drain removed at bedside. Denies CP or SOB.   Spoke w/ pt's daughter who is aware of plan for d/c to home today. Cleared for d/c to home by Dr. Rai. Pt is A+O x 2-3. BS clear. Pericardial drain site c/d/i. + BM. OOB w/ assist.   Vital Signs Last 24 Hrs  T(C): 36.6 (19 May 2025 04:50), Max: 36.7 (18 May 2025 09:33)  T(F): 97.8 (19 May 2025 04:50), Max: 98 (18 May 2025 09:33)  HR: 88 (19 May 2025 04:50) (64 - 88)  BP: 148/79 (19 May 2025 04:50) (110/52 - 152/74)  BP(mean): --  RR: 16 (19 May 2025 04:50) (16 - 18)  SpO2: 98% (19 May 2025 04:50) (98% - 100%)    Parameters below as of 19 May 2025 04:50  Patient On (Oxygen Delivery Method): room air

## 2025-05-16 NOTE — DISCHARGE NOTE PROVIDER - NSDCMRMEDTOKEN_GEN_ALL_CORE_FT
allopurinol 300 mg oral tablet: 1 tab(s) orally once a day AM  Calcium 500+D: 1 tab(s) orally once a day  colchicine 0.6 mg oral capsule: 1 cap(s) orally once a day  hydrALAZINE 10 mg oral tablet: 1 tab(s) orally once a day  levothyroxine 150 mcg (0.15 mg) oral tablet: 1 tab(s) orally once a day  losartan 50 mg oral tablet: 1 tab(s) orally once a day  lovastatin 20 mg oral tablet: 1 tab(s) orally once a day  rabeprazole 20 mg oral delayed release tablet: 1 tab(s) orally once a day  Restasis 0.05% ophthalmic emulsion: 1 drop(s) in each eye every 12 hours  Systane Preservative Free ophthalmic solution: 1 drop(s) in each eye 4 times a day   allopurinol 300 mg oral tablet: 1 tab(s) orally once a day AM  Calcium 500+D: 1 tab(s) orally once a day  colchicine 0.6 mg oral capsule: 1 cap(s) orally once a day  hydrALAZINE 50 mg oral tablet: 1 tab(s) orally 2 times a day  levothyroxine 150 mcg (0.15 mg) oral tablet: 1 tab(s) orally once a day  losartan 50 mg oral tablet: 1 tab(s) orally once a day  lovastatin 20 mg oral tablet: 1 tab(s) orally once a day  metoprolol succinate 25 mg oral tablet, extended release: 1 tab(s) orally once a day  polyethylene glycol 3350 oral powder for reconstitution: 17 gram(s) orally once a day  rabeprazole 20 mg oral delayed release tablet: 1 tab(s) orally once a day  Restasis 0.05% ophthalmic emulsion: 1 drop(s) in each eye every 12 hours  senna leaf extract oral tablet: 2 tab(s) orally once a day (at bedtime)  Systane Preservative Free ophthalmic solution: 1 drop(s) in each eye 4 times a day  Tylenol 325 mg oral tablet: 2 tab(s) orally every 6 hours as needed for  mild pain

## 2025-05-16 NOTE — DISCHARGE NOTE PROVIDER - NSDCCPGOAL_GEN_ALL_CORE_FT
Chief Complaint   Patient presents with    Other     Mouth infection unable to get a DDS appointment . - Entered by patient    Upper left side dental pain. Patient thinks it is an abscess. States he is supposed to have the teeth pulled in January. States he had similar symptoms back in July and was given antibiotics.       SUBJECTIVE:  HPI:   This 45 year old  male presents to Urgent Care for evaluation of left upper tooth pain. Patient's chief complaint documented by nursing staff was reviewed. Patient reports he started with left upper tooth pain 5 days ago. Has been unable to get appointment with his dentist. Patient is scheduled to get his 4 front upper teeth pulled in January. Had similar symptoms in July that improved after treatment with antibiotics. Denies fever. Reports swelling around the tooth, denies any drainage. Patient has been taking Ibuprofen for his symptoms.     Review of Systems:  A review of systems was performed and findings relevant to these symptoms are included in the HPI.    No current outpatient medications on file.     No current facility-administered medications for this visit.       ALLERGIES:   Allergen Reactions    Vicodin [Hydrocodone-Acetaminophen] NAUSEA       OBJECTIVE:  Vitals:    11/20/24 1529   BP: 114/80   Pulse: 80   Resp: 18   Temp: 98.2 °F (36.8 °C)   TempSrc: Oral   SpO2: 98%       Vitals were reviewed.     Physical Exam:  Constitutional: This pleasant 45 year old male presents to Urgent Care in no acute distress. Patient is alert and oriented x3.  Head: Normocephalic, atraumatic.   Mouth: Oropharynx pink and moist. Teeth in fair repair. Tooth #9 is tender with redness and swelling above the gumline.     ASSESSMENT:  Dental infection    PLAN:  - Medications: Augmentin 875 mg bid x 7 days  - Take all of antibiotic as directed.  - May take Ibuprofen up to 800 mg every  6-8 hour as needed for pain.  - Written discharge instructions were given for dental infection.  - Follow  up with dentist in 3 days if symptoms do not improve, sooner if symptoms worsen.      GREGORIA EllisNP        To get better and follow your care plan as instructed.

## 2025-05-16 NOTE — DISCHARGE NOTE NURSING/CASE MANAGEMENT/SOCIAL WORK - NSDCFUADDAPPT_GEN_ALL_CORE_FT
See your Cardiologist Dr. Morales within 1-2 weeks or as directed.   Review all cardiac medications with him during f/u visit. Have repeat Echo with Cardiologist as directed.   See Dr. Rai in 2 weeks in office. Call to make your apt. [phone]

## 2025-05-16 NOTE — DISCHARGE NOTE PROVIDER - NSDCFUSCHEDAPPT_GEN_ALL_CORE_FT
[facility]  CARDIOLOGY [address]   Scheduled Appointment: 05/20/2025    [facility]  CARDIOLOGY [address]  Scheduled Appointment: 06/10/2025    Brayden Morales  [facility]  CARDIOLOGY [address]  Scheduled Appointment: 06/10/2025

## 2025-05-16 NOTE — DISCHARGE NOTE NURSING/CASE MANAGEMENT/SOCIAL WORK - NSSCNAMETXT_GEN_ALL_CORE
[facility] [place] [phone] initial visit will be day after discharge home. A nurse will call prior to the home visit.

## 2025-05-16 NOTE — PROGRESS NOTE ADULT - SUBJECTIVE AND OBJECTIVE BOX
Thoracic Surgery Progress Note    SUBJECTIVE: Patient seen and examined at bedside with surgical team, patient without complaints. Pt did not endorse chest pain, SOB, fevers/chills, N/V.    Vital Signs Last 24 Hrs  T(C): 36.5 (16 May 2025 05:00), Max: 36.8 (15 May 2025 09:01)  T(F): 97.7 (16 May 2025 05:00), Max: 98.3 (15 May 2025 09:01)  HR: 93 (16 May 2025 05:00) (56 - 93)  BP: 148/94 (16 May 2025 05:00) (113/71 - 158/62)  BP(mean): --  RR: 18 (16 May 2025 05:00) (18 - 18)  SpO2: 99% (16 May 2025 05:00) (95% - 99%)    Parameters below as of 16 May 2025 05:00  Patient On (Oxygen Delivery Method): room air    I&O's Detail    15 May 2025 07:01  -  16 May 2025 07:00  --------------------------------------------------------  IN:    Oral Fluid: 820 mL  Total IN: 820 mL    OUT:    Chest Tube (mL): 60 mL    Voided (mL): 150 mL  Total OUT: 210 mL    Total NET: 610 mL        Medications  MEDICATIONS  (STANDING):  allopurinol 300 milliGRAM(s) Oral daily  artificial  tears Solution 1 Drop(s) Both EYES four times a day  atorvastatin 10 milliGRAM(s) Oral at bedtime  calcium carbonate 1250 mG  + Vitamin D (OsCal 500 + D) 1 Tablet(s) Oral daily  colchicine 0.6 milliGRAM(s) Oral daily  cycloSPORINE (RESTASIS) 0.05% Emulsion 1 Drop(s) Both EYES every 12 hours  heparin   Injectable 5000 Unit(s) SubCutaneous every 8 hours  hydrALAZINE 50 milliGRAM(s) Oral two times a day  levothyroxine 150 MICROGram(s) Oral daily  losartan 100 milliGRAM(s) Oral daily  metoprolol succinate ER 25 milliGRAM(s) Oral daily  pantoprazole    Tablet 40 milliGRAM(s) Oral before breakfast    MEDICATIONS  (PRN):      Physical Exam  Constitutional: A&Ox3, NAD  Eyes: Scleras clear, PERRLA/ EOMI, Gross vision intact  Respiratory: Breathing comfortably on room air, symmetrical chest rise. No sign of resp distress or accessory muscle use  Card: S1, S2  Gastrointestinal: Soft nontender, nondistended  Extremities: Moving all extremities, no edema  Skin: No Rashes, Hematoma, Ecchymosis    LABS:                        11.5   5.21  )-----------( 152      ( 15 May 2025 04:48 )             33.4     05-15    136  |  104  |  42[H]  ----------------------------<  91  3.4[L]   |  20[L]  |  2.04[H]    Ca    9.0      15 May 2025 04:48          Urinalysis Basic - ( 15 May 2025 04:48 )    Color: x / Appearance: x / SG: x / pH: x  Gluc: 91 mg/dL / Ketone: x  / Bili: x / Urobili: x   Blood: x / Protein: x / Nitrite: x   Leuk Esterase: x / RBC: x / WBC x   Sq Epi: x / Non Sq Epi: x / Bacteria: x

## 2025-05-16 NOTE — PROGRESS NOTE ADULT - SUBJECTIVE AND OBJECTIVE BOX
Cardiology Progress Note  ------------------------------------------------------------------------------------------  SUBJECTIVE:   No events overnight. Denies CP, SOB or Palpitations.   -------------------------------------------------------------------------------------------  ROS:  CV: chest pain (-), palpitation (-), orthopnea (-), PND (-), edema (-)  PULM: SOB (-), cough (-), wheezing (-), hemoptysis (-).   CONST: fever (-), chills (-) or fatigue (-)  GI: abdominal distension (-), abdominal pain (-) , nausea/vomiting (-), hematemesis, (-), melena (-), hematochezia (-)  : dysuria (-), frequency (-), hematuria (-).   NEURO: numbness (-), weakness (-), dizziness (-)  MSK: myalgia (-), joint pain (-)   SKIN: itching (-), rash (-)  HEENT:  visual changes (-); vertigo or throat pain (-);  neck stiffness (-)   Psych: change in mood (-), anxiety (-), depression (-)     All other review of systems is negative unless indicated above.   -------------------------------------------------------------------------------------------  VS:  T(F): 97.3 (05-16), Max: 98.1 (05-15)  HR: 88 (05-16) (56 - 93)  BP: 137/79 (05-16) (110/60 - 158/62)  RR: 18 (05-16)  SpO2: 97% (05-16)  I&O's Summary    15 May 2025 07:01  -  16 May 2025 07:00  --------------------------------------------------------  IN: 820 mL / OUT: 210 mL / NET: 610 mL    16 May 2025 07:01  -  16 May 2025 16:00  --------------------------------------------------------  IN: 760 mL / OUT: 925 mL / NET: -165 mL      ------------------------------------------------------------------------------------------  PHYSICAL EXAM:    -------------------------------------------------------------------------------------------  LABS:  05-16    138  |  106  |  35[H]  ----------------------------<  104[H]  4.1   |  23  |  2.04[H]    Ca    9.5      16 May 2025 08:29  Phos  2.9     05-16  Mg     1.90     05-16      Creatinine Trend: 2.04<--, 2.04<--, 1.99<--, 1.92<--, 1.72<--, 1.73<--                        12.7   4.55  )-----------( 167      ( 16 May 2025 08:29 )             36.6         Lipid Panel: T(F): 97.3 (05-16), Max: 98.1 (05-15)  HR: 88 (05-16) (56 - 93)  BP: 137/79 (05-16) (110/60 - 158/62)  RR: 18 (05-16)  SpO2: 97% (05-16)  Cardiac Enzymes:         -------------------------------------------------------------------------------------------  Meds:  allopurinol 300 milliGRAM(s) Oral daily  artificial  tears Solution 1 Drop(s) Both EYES four times a day  atorvastatin 10 milliGRAM(s) Oral at bedtime  calcium carbonate 1250 mG  + Vitamin D (OsCal 500 + D) 1 Tablet(s) Oral daily  colchicine 0.6 milliGRAM(s) Oral daily  cycloSPORINE (RESTASIS) 0.05% Emulsion 1 Drop(s) Both EYES every 12 hours  heparin   Injectable 5000 Unit(s) SubCutaneous every 8 hours  hydrALAZINE 50 milliGRAM(s) Oral two times a day  levothyroxine 150 MICROGram(s) Oral daily  losartan 100 milliGRAM(s) Oral daily  metoprolol succinate ER 25 milliGRAM(s) Oral daily  pantoprazole    Tablet 40 milliGRAM(s) Oral before breakfast    -------------------------------------------------------------------------------------------  Cardiovascular Diagnostic Testing:  -------------------------------------------------------------------------------------------  ECG:     Echo:   < from: TTE Limited W or WO Ultrasound Enhancing Agent (05.15.25 @ 09:12) >  CONCLUSIONS:      1. Left ventricular systolic function is normal. There are no regional wall motion abnormalities seen.   2. Trace pericardial effusion.    < end of copied text >    Stress Testing:    Cath:    Imaging:    CXR:  reviewed  -------------------------------------------------------------------------------------------  Assessment and Plan:   -------------------------------------------------------------------------------------------  t 89 YO M with Hx of HTN, Afib not on home AC, hypothyroidism, AS s/p TAVR, presented to the hospital after routine TTE showed moderate size pericardial effusion at the cardiology office, increased in size prior to the year prior. Pt was sent to IR for pericardial drain, however was noted to be hypertensive to 200/150. Procedure aborted thereafter. Pt asymptomatic without signs of end organ failure. Cardiology consulted for BP control.   ,  Problems Assessed:   1. Hypertensive urgency- resolved.   2. Moderate pericardial effusion  3. Chronic Afib not on home AC- rate controlled.       Plan:   - s/p IR pericardiocentesis. Drain management as per IR.   - continue current BP regimen.   - f/u pericardial effusion fluids studies   - continue colchicine   - limited TTE 5/15 with trace effusion.    ------------------------------------------------------------------------------------------  Freeman Herrera MD   of Cardiology  [facility] at [address], [address]  [place] [id number]  Phone: [phone]  Fax: [phone]  Please check [email or web address]: [email or web address] for cardiology service schedule and contact information.   ------------------------------------------------------------------------------------------

## 2025-05-16 NOTE — PROGRESS NOTE ADULT - ASSESSMENT
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 Echo done 4/2025 showed increased moderate pericardial effusion. Despite colchicine tx, CT chest 5/1/25 still showed moderate pericardial effusion. Plan for IR drainage of effusion with Dr Rascon.  5/12 - Per IR, pt elevated BP, will reschedule procedure until better BP control   5/13- IR pericardial drain placed, 600cc output.   5/14 additional 200cc since placement.  5/15 - TTE showing trace pericardial effusion  5/16 - 60cc drainage for 24hrs, cont pericardial drain to WS    PLAN  Neuro: Pain management. Bed/chair alarm, fall precautions, frequent reorientation of pt.   Pulm: Encourage coughing, deep breathing and use of incentive spirometry.  Cardio: Monitor telemetry/alarms. Monitor hypertension, required overnight hydralazine. F/u further Cardiology reccs for better b/p control   GI: Tolerating diet. Continue stool softeners.  Renal: monitor urine output, supplement electrolytes as needed  Vasc: Heparin SC/SCDs for DVT prophylaxis  Heme: Stable H/H. .   ID: Off antibiotics. Stable.  Therapy: OOB/ambulate. PT consulted - home PT  Tubes: Monitor pericardial drain output.   Disposition: Aim to D/C to home once removed.   Discussed with Cardiothoracic Team at AM rounds.

## 2025-05-17 LAB
ANION GAP SERPL CALC-SCNC: 13 MMOL/L — SIGNIFICANT CHANGE UP (ref 7–14)
BUN SERPL-MCNC: 36 MG/DL — HIGH (ref 7–23)
CALCIUM SERPL-MCNC: 9 MG/DL — SIGNIFICANT CHANGE UP (ref 8.4–10.5)
CHLORIDE SERPL-SCNC: 105 MMOL/L — SIGNIFICANT CHANGE UP (ref 98–107)
CO2 SERPL-SCNC: 18 MMOL/L — LOW (ref 22–31)
CREAT SERPL-MCNC: 2.01 MG/DL — HIGH (ref 0.5–1.3)
EGFR: 30 ML/MIN/1.73M2 — LOW
EGFR: 30 ML/MIN/1.73M2 — LOW
GLUCOSE SERPL-MCNC: 90 MG/DL — SIGNIFICANT CHANGE UP (ref 70–99)
HCT VFR BLD CALC: 37.1 % — LOW (ref 39–50)
HGB BLD-MCNC: 12.6 G/DL — LOW (ref 13–17)
MAGNESIUM SERPL-MCNC: 2 MG/DL — SIGNIFICANT CHANGE UP (ref 1.6–2.6)
MCHC RBC-ENTMCNC: 34 G/DL — SIGNIFICANT CHANGE UP (ref 32–36)
MCHC RBC-ENTMCNC: 35.2 PG — HIGH (ref 27–34)
MCV RBC AUTO: 103.6 FL — HIGH (ref 80–100)
NRBC # BLD AUTO: 0 K/UL — SIGNIFICANT CHANGE UP (ref 0–0)
NRBC # FLD: 0 K/UL — SIGNIFICANT CHANGE UP (ref 0–0)
NRBC BLD AUTO-RTO: 0 /100 WBCS — SIGNIFICANT CHANGE UP (ref 0–0)
PHOSPHATE SERPL-MCNC: 3.3 MG/DL — SIGNIFICANT CHANGE UP (ref 2.5–4.5)
PLATELET # BLD AUTO: 171 K/UL — SIGNIFICANT CHANGE UP (ref 150–400)
POTASSIUM SERPL-MCNC: 3.8 MMOL/L — SIGNIFICANT CHANGE UP (ref 3.5–5.3)
POTASSIUM SERPL-SCNC: 3.8 MMOL/L — SIGNIFICANT CHANGE UP (ref 3.5–5.3)
RBC # BLD: 3.58 M/UL — LOW (ref 4.2–5.8)
RBC # FLD: 13.8 % — SIGNIFICANT CHANGE UP (ref 10.3–14.5)
SODIUM SERPL-SCNC: 136 MMOL/L — SIGNIFICANT CHANGE UP (ref 135–145)
WBC # BLD: 4.73 K/UL — SIGNIFICANT CHANGE UP (ref 3.8–10.5)
WBC # FLD AUTO: 4.73 K/UL — SIGNIFICANT CHANGE UP (ref 3.8–10.5)

## 2025-05-17 PROCEDURE — 99231 SBSQ HOSP IP/OBS SF/LOW 25: CPT

## 2025-05-17 RX ORDER — BISACODYL 5 MG
10 TABLET, DELAYED RELEASE (ENTERIC COATED) ORAL DAILY
Refills: 0 | Status: DISCONTINUED | OUTPATIENT
Start: 2025-05-17 | End: 2025-05-19

## 2025-05-17 RX ORDER — POLYETHYLENE GLYCOL 3350 17 G/17G
17 POWDER, FOR SOLUTION ORAL DAILY
Refills: 0 | Status: DISCONTINUED | OUTPATIENT
Start: 2025-05-17 | End: 2025-05-19

## 2025-05-17 RX ORDER — SENNA 187 MG
2 TABLET ORAL AT BEDTIME
Refills: 0 | Status: DISCONTINUED | OUTPATIENT
Start: 2025-05-17 | End: 2025-05-19

## 2025-05-17 RX ADMIN — Medication 150 MICROGRAM(S): at 05:28

## 2025-05-17 RX ADMIN — LOSARTAN POTASSIUM 100 MILLIGRAM(S): 100 TABLET, FILM COATED ORAL at 05:28

## 2025-05-17 RX ADMIN — CYCLOSPORINE OPHTHALMIC SOLUTION 1 DROP(S): 1 SOLUTION/ DROPS OPHTHALMIC at 08:26

## 2025-05-17 RX ADMIN — HEPARIN SODIUM 5000 UNIT(S): 1000 INJECTION INTRAVENOUS; SUBCUTANEOUS at 14:16

## 2025-05-17 RX ADMIN — Medication 1 TABLET(S): at 12:59

## 2025-05-17 RX ADMIN — CYCLOSPORINE OPHTHALMIC SOLUTION 1 DROP(S): 1 SOLUTION/ DROPS OPHTHALMIC at 21:25

## 2025-05-17 RX ADMIN — Medication 1 DROP(S): at 17:34

## 2025-05-17 RX ADMIN — COLCHICINE 0.6 MILLIGRAM(S): 0.6 TABLET, FILM COATED ORAL at 12:59

## 2025-05-17 RX ADMIN — Medication 1 DROP(S): at 05:28

## 2025-05-17 RX ADMIN — Medication 1 DROP(S): at 12:59

## 2025-05-17 RX ADMIN — Medication 300 MILLIGRAM(S): at 12:59

## 2025-05-17 RX ADMIN — Medication 50 MILLIGRAM(S): at 05:28

## 2025-05-17 RX ADMIN — HEPARIN SODIUM 5000 UNIT(S): 1000 INJECTION INTRAVENOUS; SUBCUTANEOUS at 21:29

## 2025-05-17 RX ADMIN — ATORVASTATIN CALCIUM 10 MILLIGRAM(S): 80 TABLET, FILM COATED ORAL at 21:25

## 2025-05-17 RX ADMIN — Medication 2 TABLET(S): at 21:25

## 2025-05-17 RX ADMIN — Medication 50 MILLIGRAM(S): at 17:34

## 2025-05-17 RX ADMIN — METOPROLOL SUCCINATE 25 MILLIGRAM(S): 50 TABLET, EXTENDED RELEASE ORAL at 05:28

## 2025-05-17 RX ADMIN — Medication 40 MILLIGRAM(S): at 05:27

## 2025-05-17 RX ADMIN — HEPARIN SODIUM 5000 UNIT(S): 1000 INJECTION INTRAVENOUS; SUBCUTANEOUS at 05:28
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
## 2025-05-18 LAB
CULTURE RESULTS: SIGNIFICANT CHANGE UP
SPECIMEN SOURCE: SIGNIFICANT CHANGE UP

## 2025-05-18 PROCEDURE — 99232 SBSQ HOSP IP/OBS MODERATE 35: CPT

## 2025-05-18 RX ADMIN — HEPARIN SODIUM 5000 UNIT(S): 1000 INJECTION INTRAVENOUS; SUBCUTANEOUS at 13:49

## 2025-05-18 RX ADMIN — CYCLOSPORINE OPHTHALMIC SOLUTION 1 DROP(S): 1 SOLUTION/ DROPS OPHTHALMIC at 19:09

## 2025-05-18 RX ADMIN — Medication 50 MILLIGRAM(S): at 05:36

## 2025-05-18 RX ADMIN — Medication 300 MILLIGRAM(S): at 13:48

## 2025-05-18 RX ADMIN — Medication 1 DROP(S): at 13:47

## 2025-05-18 RX ADMIN — COLCHICINE 0.6 MILLIGRAM(S): 0.6 TABLET, FILM COATED ORAL at 13:48

## 2025-05-18 RX ADMIN — CYCLOSPORINE OPHTHALMIC SOLUTION 1 DROP(S): 1 SOLUTION/ DROPS OPHTHALMIC at 10:47

## 2025-05-18 RX ADMIN — Medication 1 DROP(S): at 19:09

## 2025-05-18 RX ADMIN — HEPARIN SODIUM 5000 UNIT(S): 1000 INJECTION INTRAVENOUS; SUBCUTANEOUS at 23:36

## 2025-05-18 RX ADMIN — Medication 1 DROP(S): at 05:36

## 2025-05-18 RX ADMIN — POLYETHYLENE GLYCOL 3350 17 GRAM(S): 17 POWDER, FOR SOLUTION ORAL at 13:48

## 2025-05-18 RX ADMIN — Medication 50 MILLIGRAM(S): at 19:10

## 2025-05-18 RX ADMIN — Medication 150 MICROGRAM(S): at 05:36

## 2025-05-18 RX ADMIN — LOSARTAN POTASSIUM 100 MILLIGRAM(S): 100 TABLET, FILM COATED ORAL at 05:35

## 2025-05-18 RX ADMIN — Medication 1 TABLET(S): at 13:49

## 2025-05-18 RX ADMIN — HEPARIN SODIUM 5000 UNIT(S): 1000 INJECTION INTRAVENOUS; SUBCUTANEOUS at 05:36

## 2025-05-18 RX ADMIN — Medication 40 MILLIGRAM(S): at 05:35

## 2025-05-18 RX ADMIN — METOPROLOL SUCCINATE 25 MILLIGRAM(S): 50 TABLET, EXTENDED RELEASE ORAL at 05:35

## 2025-05-18 RX ADMIN — ATORVASTATIN CALCIUM 10 MILLIGRAM(S): 80 TABLET, FILM COATED ORAL at 23:37
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
## 2025-05-18 NOTE — PROGRESS NOTE ADULT - REASON FOR ADMISSION
Pericardial Effusion for IR drainage

## 2025-05-18 NOTE — PROGRESS NOTE ADULT - PROVIDER SPECIALTY LIST ADULT
Cardiology
Cardiology
Thoracic Surgery
CT Surgery
Cardiology
Cardiology
Intervent Radiology
Thoracic Surgery
Thoracic Surgery
CT Surgery
Thoracic Surgery
Thoracic Surgery

## 2025-05-19 VITALS
OXYGEN SATURATION: 94 % | TEMPERATURE: 98 F | HEART RATE: 88 BPM | DIASTOLIC BLOOD PRESSURE: 60 MMHG | RESPIRATION RATE: 19 BRPM | SYSTOLIC BLOOD PRESSURE: 135 MMHG

## 2025-05-19 LAB
ANION GAP SERPL CALC-SCNC: 12 MMOL/L — SIGNIFICANT CHANGE UP (ref 7–14)
BUN SERPL-MCNC: 31 MG/DL — HIGH (ref 7–23)
CALCIUM SERPL-MCNC: 8.9 MG/DL — SIGNIFICANT CHANGE UP (ref 8.4–10.5)
CHLORIDE SERPL-SCNC: 105 MMOL/L — SIGNIFICANT CHANGE UP (ref 98–107)
CO2 SERPL-SCNC: 21 MMOL/L — LOW (ref 22–31)
CREAT SERPL-MCNC: 2.06 MG/DL — HIGH (ref 0.5–1.3)
EGFR: 29 ML/MIN/1.73M2 — LOW
EGFR: 29 ML/MIN/1.73M2 — LOW
GLUCOSE SERPL-MCNC: 90 MG/DL — SIGNIFICANT CHANGE UP (ref 70–99)
HCT VFR BLD CALC: 31.8 % — LOW (ref 39–50)
HGB BLD-MCNC: 11.3 G/DL — LOW (ref 13–17)
MCHC RBC-ENTMCNC: 35.5 G/DL — SIGNIFICANT CHANGE UP (ref 32–36)
MCHC RBC-ENTMCNC: 35.5 PG — HIGH (ref 27–34)
MCV RBC AUTO: 100 FL — SIGNIFICANT CHANGE UP (ref 80–100)
NRBC # BLD AUTO: 0 K/UL — SIGNIFICANT CHANGE UP (ref 0–0)
NRBC # FLD: 0 K/UL — SIGNIFICANT CHANGE UP (ref 0–0)
NRBC BLD AUTO-RTO: 0 /100 WBCS — SIGNIFICANT CHANGE UP (ref 0–0)
PLATELET # BLD AUTO: 179 K/UL — SIGNIFICANT CHANGE UP (ref 150–400)
POTASSIUM SERPL-MCNC: 3.7 MMOL/L — SIGNIFICANT CHANGE UP (ref 3.5–5.3)
POTASSIUM SERPL-SCNC: 3.7 MMOL/L — SIGNIFICANT CHANGE UP (ref 3.5–5.3)
RBC # BLD: 3.18 M/UL — LOW (ref 4.2–5.8)
RBC # FLD: 13.2 % — SIGNIFICANT CHANGE UP (ref 10.3–14.5)
SODIUM SERPL-SCNC: 138 MMOL/L — SIGNIFICANT CHANGE UP (ref 135–145)
WBC # BLD: 4.53 K/UL — SIGNIFICANT CHANGE UP (ref 3.8–10.5)
WBC # FLD AUTO: 4.53 K/UL — SIGNIFICANT CHANGE UP (ref 3.8–10.5)

## 2025-05-19 PROCEDURE — 99232 SBSQ HOSP IP/OBS MODERATE 35: CPT

## 2025-05-19 RX ORDER — METOPROLOL SUCCINATE 50 MG/1
1 TABLET, EXTENDED RELEASE ORAL
Qty: 0 | Refills: 0 | DISCHARGE
Start: 2025-05-19

## 2025-05-19 RX ORDER — POLYETHYLENE GLYCOL 3350 17 G/17G
17 POWDER, FOR SOLUTION ORAL
Qty: 0 | Refills: 0 | DISCHARGE
Start: 2025-05-19

## 2025-05-19 RX ORDER — SENNA 187 MG
2 TABLET ORAL
Qty: 0 | Refills: 0 | DISCHARGE
Start: 2025-05-19

## 2025-05-19 RX ORDER — ACETAMINOPHEN 500 MG/5ML
2 LIQUID (ML) ORAL
Qty: 0 | Refills: 0 | DISCHARGE

## 2025-05-19 RX ADMIN — Medication 150 MICROGRAM(S): at 05:25

## 2025-05-19 RX ADMIN — Medication 40 MILLIGRAM(S): at 05:25

## 2025-05-19 RX ADMIN — Medication 1 DROP(S): at 05:25

## 2025-05-19 RX ADMIN — LOSARTAN POTASSIUM 100 MILLIGRAM(S): 100 TABLET, FILM COATED ORAL at 05:25

## 2025-05-19 RX ADMIN — HEPARIN SODIUM 5000 UNIT(S): 1000 INJECTION INTRAVENOUS; SUBCUTANEOUS at 05:26

## 2025-05-19 RX ADMIN — METOPROLOL SUCCINATE 25 MILLIGRAM(S): 50 TABLET, EXTENDED RELEASE ORAL at 05:25

## 2025-05-19 RX ADMIN — CYCLOSPORINE OPHTHALMIC SOLUTION 1 DROP(S): 1 SOLUTION/ DROPS OPHTHALMIC at 10:14

## 2025-05-19 RX ADMIN — Medication 50 MILLIGRAM(S): at 05:25

## 2025-05-20 ENCOUNTER — APPOINTMENT (OUTPATIENT)
Dept: ELECTROPHYSIOLOGY | Facility: CLINIC | Age: 89
End: 2025-05-20

## 2025-05-20 ENCOUNTER — APPOINTMENT (OUTPATIENT)
Dept: CARDIOLOGY | Facility: CLINIC | Age: 89
End: 2025-05-20

## 2025-05-20 PROBLEM — J40 BRONCHITIS, NOT SPECIFIED AS ACUTE OR CHRONIC: Chronic | Status: ACTIVE | Noted: 2025-05-11

## 2025-05-20 PROBLEM — S77.10XA: Chronic | Status: ACTIVE | Noted: 2025-05-11

## 2025-05-20 PROBLEM — R00.1 BRADYCARDIA, UNSPECIFIED: Chronic | Status: ACTIVE | Noted: 2025-05-11

## 2025-05-20 PROBLEM — M54.30 SCIATICA, UNSPECIFIED SIDE: Chronic | Status: ACTIVE | Noted: 2025-05-11

## 2025-05-20 PROBLEM — M19.90 UNSPECIFIED OSTEOARTHRITIS, UNSPECIFIED SITE: Chronic | Status: ACTIVE | Noted: 2025-05-11

## 2025-05-20 PROBLEM — I44.2 ATRIOVENTRICULAR BLOCK, COMPLETE: Chronic | Status: ACTIVE | Noted: 2025-05-11

## 2025-05-20 PROBLEM — C34.90 MALIGNANT NEOPLASM OF UNSPECIFIED PART OF UNSPECIFIED BRONCHUS OR LUNG: Chronic | Status: ACTIVE | Noted: 2025-05-12

## 2025-05-20 PROCEDURE — 93294 REM INTERROG EVL PM/LDLS PM: CPT

## 2025-05-20 PROCEDURE — 93296 REM INTERROG EVL PM/IDS: CPT

## 2025-05-21 PROBLEM — N40.0 BENIGN PROSTATIC HYPERPLASIA WITHOUT LOWER URINARY TRACT SYMPTOMS: Chronic | Status: ACTIVE | Noted: 2025-05-11

## 2025-05-21 PROBLEM — I31.39 OTHER PERICARDIAL EFFUSION (NONINFLAMMATORY): Chronic | Status: ACTIVE | Noted: 2025-05-11

## 2025-05-21 PROBLEM — H04.123 DRY EYE SYNDROME OF BILATERAL LACRIMAL GLANDS: Chronic | Status: ACTIVE | Noted: 2025-05-11

## 2025-05-21 PROBLEM — H26.9 UNSPECIFIED CATARACT: Chronic | Status: ACTIVE | Noted: 2025-05-11

## 2025-05-21 PROBLEM — Z77.090 CONTACT WITH AND (SUSPECTED) EXPOSURE TO ASBESTOS: Chronic | Status: ACTIVE | Noted: 2025-05-11

## 2025-05-21 PROBLEM — S22.39XA FRACTURE OF ONE RIB, UNSPECIFIED SIDE, INITIAL ENCOUNTER FOR CLOSED FRACTURE: Chronic | Status: ACTIVE | Noted: 2025-05-11

## 2025-05-21 NOTE — PATIENT PROFILE ADULT - NSPROCHRONICPAIN_GEN_A_NUR
No current rx - free T4 wnl - Plan for repeat TFTs June or July? - make sure to HOLD biotin prior if taking   no

## 2025-06-02 ENCOUNTER — APPOINTMENT (OUTPATIENT)
Dept: THORACIC SURGERY | Facility: CLINIC | Age: 89
End: 2025-06-02
Payer: MEDICARE

## 2025-06-02 VITALS
DIASTOLIC BLOOD PRESSURE: 63 MMHG | WEIGHT: 145 LBS | HEIGHT: 66 IN | OXYGEN SATURATION: 97 % | HEART RATE: 94 BPM | RESPIRATION RATE: 17 BRPM | SYSTOLIC BLOOD PRESSURE: 136 MMHG | BODY MASS INDEX: 23.3 KG/M2

## 2025-06-02 PROCEDURE — 99214 OFFICE O/P EST MOD 30 MIN: CPT

## 2025-06-02 RX ORDER — FINASTERIDE 1 MG/1
TABLET ORAL
Refills: 0 | Status: ACTIVE | COMMUNITY

## 2025-06-02 RX ORDER — ALFUZOSIN HYDROCHLORIDE 10 MG/1
TABLET, EXTENDED RELEASE ORAL
Refills: 0 | Status: ACTIVE | COMMUNITY

## 2025-06-10 ENCOUNTER — APPOINTMENT (OUTPATIENT)
Dept: CARDIOLOGY | Facility: CLINIC | Age: 89
End: 2025-06-10
Payer: MEDICARE

## 2025-06-10 VITALS
RESPIRATION RATE: 18 BRPM | BODY MASS INDEX: 23.3 KG/M2 | SYSTOLIC BLOOD PRESSURE: 116 MMHG | HEIGHT: 66 IN | HEART RATE: 84 BPM | WEIGHT: 145 LBS | DIASTOLIC BLOOD PRESSURE: 84 MMHG | OXYGEN SATURATION: 98 %

## 2025-06-10 PROCEDURE — 99215 OFFICE O/P EST HI 40 MIN: CPT

## 2025-06-10 PROCEDURE — 93306 TTE W/DOPPLER COMPLETE: CPT

## 2025-06-10 PROCEDURE — G2211 COMPLEX E/M VISIT ADD ON: CPT

## 2025-06-10 PROCEDURE — 93000 ELECTROCARDIOGRAM COMPLETE: CPT

## 2025-06-11 LAB
CULTURE RESULTS: SIGNIFICANT CHANGE UP
SPECIMEN SOURCE: SIGNIFICANT CHANGE UP

## 2025-07-16 ENCOUNTER — APPOINTMENT (OUTPATIENT)
Dept: CARDIOLOGY | Facility: CLINIC | Age: 89
End: 2025-07-16
Payer: MEDICARE

## 2025-07-16 VITALS
HEART RATE: 76 BPM | WEIGHT: 130 LBS | BODY MASS INDEX: 20.89 KG/M2 | SYSTOLIC BLOOD PRESSURE: 176 MMHG | OXYGEN SATURATION: 98 % | DIASTOLIC BLOOD PRESSURE: 84 MMHG | HEIGHT: 66 IN

## 2025-07-16 PROCEDURE — 93000 ELECTROCARDIOGRAM COMPLETE: CPT

## 2025-07-16 PROCEDURE — G2211 COMPLEX E/M VISIT ADD ON: CPT

## 2025-07-16 PROCEDURE — 99215 OFFICE O/P EST HI 40 MIN: CPT

## 2025-07-16 PROCEDURE — 93306 TTE W/DOPPLER COMPLETE: CPT

## 2025-08-19 ENCOUNTER — APPOINTMENT (OUTPATIENT)
Dept: ELECTROPHYSIOLOGY | Facility: CLINIC | Age: 89
End: 2025-08-19

## 2025-08-19 PROCEDURE — 93296 REM INTERROG EVL PM/IDS: CPT

## 2025-08-19 PROCEDURE — 93294 REM INTERROG EVL PM/LDLS PM: CPT

## 2025-09-05 ENCOUNTER — APPOINTMENT (OUTPATIENT)
Dept: CARDIOLOGY | Facility: CLINIC | Age: 89
End: 2025-09-05
Payer: MEDICARE

## 2025-09-05 VITALS
SYSTOLIC BLOOD PRESSURE: 167 MMHG | OXYGEN SATURATION: 100 % | RESPIRATION RATE: 19 BRPM | DIASTOLIC BLOOD PRESSURE: 75 MMHG | HEIGHT: 66 IN | WEIGHT: 130 LBS | HEART RATE: 72 BPM | BODY MASS INDEX: 20.89 KG/M2

## 2025-09-05 DIAGNOSIS — I31.39 OTHER PERICARDIAL EFFUSION (NONINFLAMMATORY): ICD-10-CM

## 2025-09-05 DIAGNOSIS — Z95.3 PRESENCE OF XENOGENIC HEART VALVE: ICD-10-CM

## 2025-09-05 DIAGNOSIS — I44.2 ATRIOVENTRICULAR BLOCK, COMPLETE: ICD-10-CM

## 2025-09-05 DIAGNOSIS — I35.0 NONRHEUMATIC AORTIC (VALVE) STENOSIS: ICD-10-CM

## 2025-09-05 DIAGNOSIS — Z95.0 PRESENCE OF CARDIAC PACEMAKER: ICD-10-CM

## 2025-09-05 DIAGNOSIS — I48.91 UNSPECIFIED ATRIAL FIBRILLATION: ICD-10-CM

## 2025-09-05 DIAGNOSIS — I10 ESSENTIAL (PRIMARY) HYPERTENSION: ICD-10-CM

## 2025-09-05 PROCEDURE — G2211 COMPLEX E/M VISIT ADD ON: CPT

## 2025-09-05 PROCEDURE — 99215 OFFICE O/P EST HI 40 MIN: CPT

## 2025-09-05 PROCEDURE — 93000 ELECTROCARDIOGRAM COMPLETE: CPT
